# Patient Record
Sex: MALE | Race: WHITE | NOT HISPANIC OR LATINO | Employment: OTHER | RURAL
[De-identification: names, ages, dates, MRNs, and addresses within clinical notes are randomized per-mention and may not be internally consistent; named-entity substitution may affect disease eponyms.]

---

## 2017-08-09 ENCOUNTER — HISTORICAL (OUTPATIENT)
Dept: ADMINISTRATIVE | Facility: HOSPITAL | Age: 52
End: 2017-08-09

## 2017-08-09 LAB — HM COLONOSCOPY: NORMAL

## 2017-08-10 LAB
LAB AP CLINICAL INFORMATION: NORMAL
LAB AP COMMENTS: NORMAL
LAB AP DIAGNOSIS - HISTORICAL: NORMAL
LAB AP GROSS PATHOLOGY - HISTORICAL: NORMAL
LAB AP SPECIMEN SUBMITTED - HISTORICAL: NORMAL

## 2018-10-09 ENCOUNTER — HISTORICAL (OUTPATIENT)
Dept: ADMINISTRATIVE | Facility: HOSPITAL | Age: 53
End: 2018-10-09

## 2018-10-11 LAB
LAB AP CLINICAL INFORMATION: NORMAL
LAB AP DIAGNOSIS - HISTORICAL: NORMAL
LAB AP GROSS PATHOLOGY - HISTORICAL: NORMAL
LAB AP SPECIMEN SUBMITTED - HISTORICAL: NORMAL

## 2019-04-09 ENCOUNTER — HISTORICAL (OUTPATIENT)
Dept: ADMINISTRATIVE | Facility: HOSPITAL | Age: 54
End: 2019-04-09

## 2021-06-22 ENCOUNTER — OFFICE VISIT (OUTPATIENT)
Dept: FAMILY MEDICINE | Facility: CLINIC | Age: 56
End: 2021-06-22
Payer: OTHER GOVERNMENT

## 2021-06-22 VITALS
OXYGEN SATURATION: 97 % | RESPIRATION RATE: 20 BRPM | BODY MASS INDEX: 28.24 KG/M2 | HEART RATE: 68 BPM | DIASTOLIC BLOOD PRESSURE: 64 MMHG | WEIGHT: 190.63 LBS | TEMPERATURE: 97 F | HEIGHT: 69 IN | SYSTOLIC BLOOD PRESSURE: 110 MMHG

## 2021-06-22 DIAGNOSIS — G89.29 CHRONIC MIDLINE LOW BACK PAIN WITHOUT SCIATICA: Primary | ICD-10-CM

## 2021-06-22 DIAGNOSIS — N48.6 PEYRONIE'S DISEASE: ICD-10-CM

## 2021-06-22 DIAGNOSIS — M54.50 CHRONIC MIDLINE LOW BACK PAIN WITHOUT SCIATICA: Primary | ICD-10-CM

## 2021-06-22 DIAGNOSIS — M54.9 DORSALGIA, UNSPECIFIED: ICD-10-CM

## 2021-06-22 PROCEDURE — 99213 OFFICE O/P EST LOW 20 MIN: CPT | Mod: 25,,, | Performed by: NURSE PRACTITIONER

## 2021-06-22 PROCEDURE — 96372 PR INJECTION,THERAP/PROPH/DIAG2ST, IM OR SUBCUT: ICD-10-PCS | Mod: ,,, | Performed by: NURSE PRACTITIONER

## 2021-06-22 PROCEDURE — 96372 THER/PROPH/DIAG INJ SC/IM: CPT | Mod: ,,, | Performed by: NURSE PRACTITIONER

## 2021-06-22 PROCEDURE — 99213 PR OFFICE/OUTPT VISIT, EST, LEVL III, 20-29 MIN: ICD-10-PCS | Mod: 25,,, | Performed by: NURSE PRACTITIONER

## 2021-06-22 RX ORDER — DEXAMETHASONE SODIUM PHOSPHATE 4 MG/ML
4 INJECTION, SOLUTION INTRA-ARTICULAR; INTRALESIONAL; INTRAMUSCULAR; INTRAVENOUS; SOFT TISSUE
Status: DISCONTINUED | OUTPATIENT
Start: 2021-06-22 | End: 2021-06-22

## 2021-06-22 RX ORDER — LEVOFLOXACIN 750 MG/1
750 TABLET ORAL DAILY
COMMUNITY
Start: 2021-04-14 | End: 2021-06-22

## 2021-06-22 RX ORDER — ROSUVASTATIN CALCIUM 20 MG/1
20 TABLET, COATED ORAL DAILY
COMMUNITY
Start: 2021-05-28 | End: 2021-09-07 | Stop reason: SDUPTHER

## 2021-06-22 RX ORDER — DEXAMETHASONE SODIUM PHOSPHATE 4 MG/ML
4 INJECTION, SOLUTION INTRA-ARTICULAR; INTRALESIONAL; INTRAMUSCULAR; INTRAVENOUS; SOFT TISSUE
Status: COMPLETED | OUTPATIENT
Start: 2021-06-22 | End: 2021-06-22

## 2021-06-22 RX ORDER — TIZANIDINE 4 MG/1
4 TABLET ORAL EVERY 6 HOURS PRN
Qty: 14 TABLET | Refills: 0 | Status: SHIPPED | OUTPATIENT
Start: 2021-06-22 | End: 2021-07-02

## 2021-06-22 RX ORDER — METHYLPREDNISOLONE ACETATE 40 MG/ML
40 INJECTION, SUSPENSION INTRA-ARTICULAR; INTRALESIONAL; INTRAMUSCULAR; SOFT TISSUE
Status: COMPLETED | OUTPATIENT
Start: 2021-06-22 | End: 2021-06-22

## 2021-06-22 RX ORDER — MELOXICAM 7.5 MG/1
7.5 TABLET ORAL 2 TIMES DAILY
Qty: 60 TABLET | Refills: 0 | Status: SHIPPED | OUTPATIENT
Start: 2021-06-22 | End: 2022-12-06

## 2021-06-22 RX ADMIN — METHYLPREDNISOLONE ACETATE 40 MG: 40 INJECTION, SUSPENSION INTRA-ARTICULAR; INTRALESIONAL; INTRAMUSCULAR; SOFT TISSUE at 03:06

## 2021-06-22 RX ADMIN — DEXAMETHASONE SODIUM PHOSPHATE 4 MG: 4 INJECTION, SOLUTION INTRA-ARTICULAR; INTRALESIONAL; INTRAMUSCULAR; INTRAVENOUS; SOFT TISSUE at 03:06

## 2021-07-21 ENCOUNTER — HOSPITAL ENCOUNTER (OUTPATIENT)
Dept: RADIOLOGY | Facility: HOSPITAL | Age: 56
Discharge: HOME | End: 2021-07-21
Attending: NURSE PRACTITIONER
Payer: OTHER GOVERNMENT

## 2021-07-21 DIAGNOSIS — M51.36 BULGING OF LUMBAR INTERVERTEBRAL DISC WITHOUT MYELOPATHY: Primary | ICD-10-CM

## 2021-07-21 DIAGNOSIS — M54.50 CHRONIC MIDLINE LOW BACK PAIN WITHOUT SCIATICA: ICD-10-CM

## 2021-07-21 DIAGNOSIS — G89.29 CHRONIC MIDLINE LOW BACK PAIN WITHOUT SCIATICA: ICD-10-CM

## 2021-07-21 PROCEDURE — 72148 MRI LUMBAR SPINE W/O DYE: CPT | Mod: TC

## 2021-09-07 ENCOUNTER — INFUSION (OUTPATIENT)
Dept: INFECTIOUS DISEASES | Facility: HOSPITAL | Age: 56
End: 2021-09-07
Attending: NURSE PRACTITIONER
Payer: OTHER GOVERNMENT

## 2021-09-07 ENCOUNTER — OFFICE VISIT (OUTPATIENT)
Dept: FAMILY MEDICINE | Facility: CLINIC | Age: 56
End: 2021-09-07
Payer: OTHER GOVERNMENT

## 2021-09-07 VITALS
OXYGEN SATURATION: 95 % | TEMPERATURE: 99 F | HEART RATE: 84 BPM | DIASTOLIC BLOOD PRESSURE: 78 MMHG | RESPIRATION RATE: 20 BRPM | SYSTOLIC BLOOD PRESSURE: 120 MMHG

## 2021-09-07 VITALS
DIASTOLIC BLOOD PRESSURE: 73 MMHG | RESPIRATION RATE: 18 BRPM | SYSTOLIC BLOOD PRESSURE: 111 MMHG | HEIGHT: 69 IN | WEIGHT: 195 LBS | OXYGEN SATURATION: 97 % | BODY MASS INDEX: 28.88 KG/M2 | HEART RATE: 69 BPM | TEMPERATURE: 99 F

## 2021-09-07 DIAGNOSIS — U07.1 COVID-19: Primary | ICD-10-CM

## 2021-09-07 DIAGNOSIS — Z20.828 CONTACT WITH AND (SUSPECTED) EXPOSURE TO OTHER VIRAL COMMUNICABLE DISEASES: ICD-10-CM

## 2021-09-07 DIAGNOSIS — E78.5 HYPERLIPIDEMIA, UNSPECIFIED HYPERLIPIDEMIA TYPE: ICD-10-CM

## 2021-09-07 LAB
CTP QC/QA: YES
FLUAV AG NPH QL: NEGATIVE
FLUBV AG NPH QL: NEGATIVE
SARS-COV-2 AG RESP QL IA.RAPID: POSITIVE

## 2021-09-07 PROCEDURE — 63600175 PHARM REV CODE 636 W HCPCS: Performed by: NURSE PRACTITIONER

## 2021-09-07 PROCEDURE — 99213 PR OFFICE/OUTPT VISIT, EST, LEVL III, 20-29 MIN: ICD-10-PCS | Mod: ,,, | Performed by: NURSE PRACTITIONER

## 2021-09-07 PROCEDURE — 87428 SARSCOV & INF VIR A&B AG IA: CPT | Mod: QW,,, | Performed by: NURSE PRACTITIONER

## 2021-09-07 PROCEDURE — 25000003 PHARM REV CODE 250: Performed by: NURSE PRACTITIONER

## 2021-09-07 PROCEDURE — 99213 OFFICE O/P EST LOW 20 MIN: CPT | Mod: ,,, | Performed by: NURSE PRACTITIONER

## 2021-09-07 PROCEDURE — M0243 CASIRIVI AND IMDEVI INFUSION: HCPCS | Performed by: NURSE PRACTITIONER

## 2021-09-07 PROCEDURE — 87428 POCT SARS-COV2 (COVID) WITH FLU ANTIGEN: ICD-10-PCS | Mod: QW,,, | Performed by: NURSE PRACTITIONER

## 2021-09-07 RX ORDER — METHYLPREDNISOLONE 4 MG/1
TABLET ORAL
Qty: 1 PACKAGE | Refills: 0 | Status: SHIPPED | OUTPATIENT
Start: 2021-09-07 | End: 2021-09-28

## 2021-09-07 RX ORDER — ROSUVASTATIN CALCIUM 20 MG/1
20 TABLET, COATED ORAL DAILY
Qty: 30 TABLET | Refills: 5 | Status: SHIPPED | OUTPATIENT
Start: 2021-09-07 | End: 2022-03-22 | Stop reason: SDUPTHER

## 2021-09-07 RX ORDER — SODIUM CHLORIDE 0.9 % (FLUSH) 0.9 %
10 SYRINGE (ML) INJECTION
Status: DISCONTINUED | OUTPATIENT
Start: 2021-09-07 | End: 2022-03-22

## 2021-09-07 RX ORDER — DIPHENHYDRAMINE HYDROCHLORIDE 50 MG/ML
25 INJECTION INTRAMUSCULAR; INTRAVENOUS ONCE AS NEEDED
Status: DISCONTINUED | OUTPATIENT
Start: 2021-09-07 | End: 2022-03-22

## 2021-09-07 RX ORDER — EPINEPHRINE 0.3 MG/.3ML
0.3 INJECTION SUBCUTANEOUS
Status: DISCONTINUED | OUTPATIENT
Start: 2021-09-07 | End: 2022-03-22

## 2021-09-07 RX ORDER — SODIUM CHLORIDE 9 MG/ML
INJECTION, SOLUTION INTRAVENOUS
Status: DISCONTINUED
Start: 2021-09-07 | End: 2021-09-07 | Stop reason: HOSPADM

## 2021-09-07 RX ORDER — ONDANSETRON 4 MG/1
4 TABLET, ORALLY DISINTEGRATING ORAL ONCE AS NEEDED
Status: DISCONTINUED | OUTPATIENT
Start: 2021-09-07 | End: 2022-03-22

## 2021-09-07 RX ORDER — ALBUTEROL SULFATE 90 UG/1
2 AEROSOL, METERED RESPIRATORY (INHALATION)
Status: DISCONTINUED | OUTPATIENT
Start: 2021-09-07 | End: 2022-03-22

## 2021-09-07 RX ORDER — ACETAMINOPHEN 325 MG/1
650 TABLET ORAL ONCE AS NEEDED
Status: DISCONTINUED | OUTPATIENT
Start: 2021-09-07 | End: 2022-03-22

## 2021-09-07 RX ORDER — AZITHROMYCIN 250 MG/1
TABLET, FILM COATED ORAL
Qty: 6 TABLET | Refills: 0 | Status: SHIPPED | OUTPATIENT
Start: 2021-09-07 | End: 2022-03-22 | Stop reason: ALTCHOICE

## 2021-09-07 RX ADMIN — SODIUM CHLORIDE 600 MG: 9 INJECTION, SOLUTION INTRAVENOUS at 01:09

## 2022-03-22 ENCOUNTER — HOSPITAL ENCOUNTER (OUTPATIENT)
Dept: RADIOLOGY | Facility: HOSPITAL | Age: 57
Discharge: HOME | End: 2022-03-22
Attending: NURSE PRACTITIONER
Payer: OTHER GOVERNMENT

## 2022-03-22 ENCOUNTER — OFFICE VISIT (OUTPATIENT)
Dept: FAMILY MEDICINE | Facility: CLINIC | Age: 57
End: 2022-03-22
Payer: OTHER GOVERNMENT

## 2022-03-22 VITALS
HEART RATE: 67 BPM | OXYGEN SATURATION: 98 % | SYSTOLIC BLOOD PRESSURE: 116 MMHG | WEIGHT: 188 LBS | DIASTOLIC BLOOD PRESSURE: 76 MMHG | RESPIRATION RATE: 17 BRPM | TEMPERATURE: 98 F | BODY MASS INDEX: 27.85 KG/M2 | HEIGHT: 69 IN

## 2022-03-22 DIAGNOSIS — E78.5 HYPERLIPIDEMIA, UNSPECIFIED HYPERLIPIDEMIA TYPE: ICD-10-CM

## 2022-03-22 DIAGNOSIS — R10.31 RIGHT LOWER QUADRANT ABDOMINAL PAIN: Primary | ICD-10-CM

## 2022-03-22 DIAGNOSIS — R10.31 RIGHT LOWER QUADRANT ABDOMINAL PAIN: ICD-10-CM

## 2022-03-22 LAB
ALBUMIN SERPL BCP-MCNC: 4.3 G/DL (ref 3.5–5)
ALBUMIN/GLOB SERPL: 1.4 {RATIO}
ALP SERPL-CCNC: 86 U/L (ref 45–115)
ALT SERPL W P-5'-P-CCNC: 41 U/L (ref 16–61)
ANION GAP SERPL CALCULATED.3IONS-SCNC: 11 MMOL/L (ref 7–16)
AST SERPL W P-5'-P-CCNC: 21 U/L (ref 15–37)
BASOPHILS # BLD AUTO: 0.07 K/UL (ref 0–0.2)
BASOPHILS NFR BLD AUTO: 1.2 % (ref 0–1)
BILIRUB SERPL-MCNC: 1.1 MG/DL (ref 0–1.2)
BUN SERPL-MCNC: 12 MG/DL (ref 7–18)
BUN/CREAT SERPL: 13 (ref 6–20)
CALCIUM SERPL-MCNC: 9.1 MG/DL (ref 8.5–10.1)
CHLORIDE SERPL-SCNC: 103 MMOL/L (ref 98–107)
CHOLEST SERPL-MCNC: 330 MG/DL (ref 0–200)
CHOLEST/HDLC SERPL: 6 {RATIO}
CO2 SERPL-SCNC: 31 MMOL/L (ref 21–32)
CREAT SERPL-MCNC: 0.9 MG/DL (ref 0.7–1.3)
DIFFERENTIAL METHOD BLD: ABNORMAL
EOSINOPHIL # BLD AUTO: 0.14 K/UL (ref 0–0.5)
EOSINOPHIL NFR BLD AUTO: 2.3 % (ref 1–4)
ERYTHROCYTE [DISTWIDTH] IN BLOOD BY AUTOMATED COUNT: 11.9 % (ref 11.5–14.5)
GLOBULIN SER-MCNC: 3.1 G/DL (ref 2–4)
GLUCOSE SERPL-MCNC: 70 MG/DL (ref 74–106)
HCT VFR BLD AUTO: 49.9 % (ref 40–54)
HDLC SERPL-MCNC: 55 MG/DL (ref 40–60)
HGB BLD-MCNC: 16.5 G/DL (ref 13.5–18)
IMM GRANULOCYTES # BLD AUTO: 0.02 K/UL (ref 0–0.04)
IMM GRANULOCYTES NFR BLD: 0.3 % (ref 0–0.4)
LDLC SERPL CALC-MCNC: 232 MG/DL
LDLC/HDLC SERPL: 4.2 {RATIO}
LYMPHOCYTES # BLD AUTO: 1.54 K/UL (ref 1–4.8)
LYMPHOCYTES NFR BLD AUTO: 25.5 % (ref 27–41)
MCH RBC QN AUTO: 32.7 PG (ref 27–31)
MCHC RBC AUTO-ENTMCNC: 33.1 G/DL (ref 32–36)
MCV RBC AUTO: 98.8 FL (ref 80–96)
MONOCYTES # BLD AUTO: 0.64 K/UL (ref 0–0.8)
MONOCYTES NFR BLD AUTO: 10.6 % (ref 2–6)
MPC BLD CALC-MCNC: 10 FL (ref 9.4–12.4)
NEUTROPHILS # BLD AUTO: 3.62 K/UL (ref 1.8–7.7)
NEUTROPHILS NFR BLD AUTO: 60.1 % (ref 53–65)
NONHDLC SERPL-MCNC: 275 MG/DL
NRBC # BLD AUTO: 0 X10E3/UL
NRBC, AUTO (.00): 0 %
PLATELET # BLD AUTO: 246 K/UL (ref 150–400)
POTASSIUM SERPL-SCNC: 4.9 MMOL/L (ref 3.5–5.1)
PROT SERPL-MCNC: 7.4 G/DL (ref 6.4–8.2)
RBC # BLD AUTO: 5.05 M/UL (ref 4.6–6.2)
SODIUM SERPL-SCNC: 140 MMOL/L (ref 136–145)
TRIGL SERPL-MCNC: 214 MG/DL (ref 35–150)
VLDLC SERPL-MCNC: 43 MG/DL
WBC # BLD AUTO: 6.03 K/UL (ref 4.5–11)

## 2022-03-22 PROCEDURE — 80061 LIPID PANEL: CPT | Mod: ,,, | Performed by: CLINICAL MEDICAL LABORATORY

## 2022-03-22 PROCEDURE — 80061 LIPID PANEL: ICD-10-PCS | Mod: ,,, | Performed by: CLINICAL MEDICAL LABORATORY

## 2022-03-22 PROCEDURE — 85025 COMPLETE CBC W/AUTO DIFF WBC: CPT | Mod: ,,, | Performed by: CLINICAL MEDICAL LABORATORY

## 2022-03-22 PROCEDURE — 99213 OFFICE O/P EST LOW 20 MIN: CPT | Mod: ,,, | Performed by: NURSE PRACTITIONER

## 2022-03-22 PROCEDURE — 80053 COMPREHEN METABOLIC PANEL: CPT | Mod: ,,, | Performed by: CLINICAL MEDICAL LABORATORY

## 2022-03-22 PROCEDURE — 85025 CBC WITH DIFFERENTIAL: ICD-10-PCS | Mod: ,,, | Performed by: CLINICAL MEDICAL LABORATORY

## 2022-03-22 PROCEDURE — 76700 US EXAM ABDOM COMPLETE: CPT | Mod: TC

## 2022-03-22 PROCEDURE — 99213 PR OFFICE/OUTPT VISIT, EST, LEVL III, 20-29 MIN: ICD-10-PCS | Mod: ,,, | Performed by: NURSE PRACTITIONER

## 2022-03-22 PROCEDURE — 80053 COMPREHENSIVE METABOLIC PANEL: ICD-10-PCS | Mod: ,,, | Performed by: CLINICAL MEDICAL LABORATORY

## 2022-03-22 RX ORDER — METHOCARBAMOL 500 MG/1
500 TABLET, FILM COATED ORAL 4 TIMES DAILY PRN
COMMUNITY
Start: 2021-10-04

## 2022-03-22 RX ORDER — ROSUVASTATIN CALCIUM 20 MG/1
20 TABLET, COATED ORAL DAILY
Qty: 30 TABLET | Refills: 5 | Status: SHIPPED | OUTPATIENT
Start: 2022-03-22 | End: 2022-03-23 | Stop reason: DRUGHIGH

## 2022-03-22 NOTE — PROGRESS NOTES
CLARENCE Fields   [facility]  [address]  [place] [address]     PATIENT NAME: Linden Chávez  : 1965  DATE: 3/22/22  MRN: [id number]      Billing Provider: CLARENCE Fields  Level of Service:   Patient PCP Information     Provider PCP Type    CLARENCE Fields [facility]          Reason for Visit / Chief Complaint: Abdominal Pain (Pt c/o pain and burning sensation in the RLQ of his abdomen, states that it hurts the worst when he sneezes, can feel some discomfort with certain movements. )       Update PCP  Update Chief Complaint         History of Present Illness / Problem Focused Workflow     Linden Chávez presents to the clinic c/o burning pain right lower abd for over a week. Pt states pain started after he sneezed; pain is better but he still notices pain with sneezing and coughing. Denies changes in urination or BM; denies N/V and no hx of kidney stone      Review of Systems     Review of Systems   Constitutional: Negative.  Negative for activity change, appetite change, chills, fever and unexpected weight change.   Respiratory: Negative for cough, chest tightness and shortness of breath.    Cardiovascular: Negative for chest pain, palpitations and leg swelling.   Gastrointestinal: Positive for abdominal pain. Negative for abdominal distention, blood in stool, change in bowel habit, constipation, diarrhea, nausea, rectal pain, vomiting and change in bowel habit.   Genitourinary: Negative for difficulty urinating, frequency, hematuria and urgency.   Musculoskeletal: Positive for back pain.   Neurological: Negative for weakness and headaches.        Medical / Social / Family History     Past Medical History:   Diagnosis Date    Actinic keratoses     BCC (basal cell carcinoma of skin) 2019    left inferior posterior helix, Dr. Palacio    BPH (benign prostatic hyperplasia)     DDD (degenerative disc disease), thoracolumbar     GERD (gastroesophageal reflux disease)      Hyperlipidemia     Sleep apnea        Past Surgical History:   Procedure Laterality Date    BASAL CELL CARCINOMA EXCISION Left     ear    COLONOSCOPY  08/09/2017    SKIN BIOPSY  10/10/2018       Social History    reports that he has never smoked. His smokeless tobacco use includes snuff. He reports current alcohol use of about 14.0 standard drinks of alcohol per week. He reports that he does not use drugs.    Family History  's family history includes Arthritis in his brother; Cancer in his father and mother; Heart disease in his father; Hyperlipidemia in his father; Melanoma in his father.    Medications and Allergies     Medications  Outpatient Medications Marked as Taking for the 3/22/22 encounter (Office Visit) with CLARENCE Farah   Medication Sig Dispense Refill    meloxicam (MOBIC) 7.5 MG tablet Take 1 tablet (7.5 mg total) by mouth 2 (two) times daily. 60 tablet 0    methocarbamoL (ROBAXIN) 500 MG Tab Take 500 mg by mouth 4 (four) times daily as needed.      [DISCONTINUED] rosuvastatin (CRESTOR) 20 MG tablet Take 1 tablet (20 mg total) by mouth once daily. 30 tablet 5     Current Facility-Administered Medications for the 3/22/22 encounter (Office Visit) with CLARENCE Farah   Medication Dose Route Frequency Provider Last Rate Last Admin    [DISCONTINUED] acetaminophen tablet 650 mg  650 mg Oral Once PRN CLARENCE Farah        [DISCONTINUED] albuterol inhaler 2 puff  2 puff Inhalation Q20 Min PRN CLARENCE Farah        [DISCONTINUED] diphenhydrAMINE injection 25 mg  25 mg Intravenous Once PRN CALRENCE Farah        [DISCONTINUED] EPINEPHrine (EPIPEN) 0.3 mg/0.3 mL pen injection 0.3 mg  0.3 mg Intramuscular PRN CLARENCE Farah        [DISCONTINUED] methylPREDNISolone sodium succinate injection 40 mg  40 mg Intravenous Once PRN CLARENCE Farah        [DISCONTINUED] ondansetron disintegrating tablet 4 mg  4 mg Oral Once PRN Roxana Bailey  "FNP        [DISCONTINUED] sodium chloride 0.9% 500 mL flush bag   Intravenous PRN Roxana FLOWERS CLARENCE Bailey        [DISCONTINUED] sodium chloride 0.9% flush 10 mL  10 mL Intravenous PRN CLARENCE Farah           Allergies  Review of patient's allergies indicates:  No Known Allergies    Physical Examination     Vitals:    03/22/22 0905   BP: 116/76   BP Location: Right arm   Patient Position: Sitting   BP Method: Large (Manual)   Pulse: 67   Resp: 17   Temp: 98 °F (36.7 °C)   TempSrc: Oral   SpO2: 98%   Weight: 85.3 kg (188 lb)   Height: 5' 9" (1.753 m)      Physical Exam  Constitutional:       General: He is not in acute distress.     Appearance: Normal appearance.   Neck:      Thyroid: No thyromegaly.      Vascular: No carotid bruit.   Cardiovascular:      Rate and Rhythm: Normal rate and regular rhythm.      Heart sounds: No murmur heard.  Pulmonary:      Effort: Pulmonary effort is normal. No accessory muscle usage or respiratory distress.      Breath sounds: Normal breath sounds. No wheezing, rhonchi or rales.   Abdominal:      General: Bowel sounds are normal. There is no distension.      Palpations: Abdomen is soft. There is no mass.      Tenderness: There is abdominal tenderness in the right lower quadrant. There is no rebound.      Hernia: No hernia is present. There is no hernia in the right femoral area or right inguinal area.       Musculoskeletal:         General: Normal range of motion.      Cervical back: Neck supple.   Skin:     General: Skin is warm and dry.      Coloration: Skin is not cyanotic, jaundiced or pale.   Neurological:      Mental Status: He is alert and oriented to person, place, and time.      Cranial Nerves: Cranial nerves are intact.      Gait: Gait is intact.          Assessment and Plan (including Health Maintenance)      Problem List  Smart Sets  Document Outside HM   :        Health Maintenance Due   Topic Date Due    Hepatitis C Screening  Never done    Lipid Panel  Never " done    HIV Screening  Never done    Shingles Vaccine (1 of 2) Never done    COVID-19 Vaccine (3 - Booster for Moderna series) 07/02/2021    Influenza Vaccine (1) 09/01/2021       Problem List Items Addressed This Visit    None     Visit Diagnoses     Right lower quadrant abdominal pain    -  Primary    Flat and upright of abd obtained and sent to radiology and pt sent to [place] for US which was normal. Probable pulled muscle since symptoms have improved.    Relevant Orders    X-Ray Abdomen Flat And Erect (Completed)    CBC Auto Differential    Comprehensive Metabolic Panel    US Abdomen Complete (Completed)    Hyperlipidemia, unspecified hyperlipidemia type        Fasting lipids ordered. Continue current medication and LFLC. LDL goal < 70    Relevant Medications    rosuvastatin (CRESTOR) 20 MG tablet    Other Relevant Orders    Lipid Panel        Right lower quadrant abdominal pain  Comments:  Flat and upright of abd obtained and sent to radiology and pt sent to [place] for US which was normal. Probable pulled muscle since symptoms have improved.  Orders:  -     X-Ray Abdomen Flat And Erect; Future; Expected date: 03/22/2022  -     CBC Auto Differential; Future; Expected date: 03/22/2022  -     Comprehensive Metabolic Panel; Future; Expected date: 03/22/2022  -     US Abdomen Complete; Future; Expected date: 03/22/2022    Hyperlipidemia, unspecified hyperlipidemia type  Comments:  Fasting lipids ordered. Continue current medication and LFLC. LDL goal < 70  Orders:  -     rosuvastatin (CRESTOR) 20 MG tablet; Take 1 tablet (20 mg total) by mouth once daily.  Dispense: 30 tablet; Refill: 5  -     Lipid Panel; Future; Expected date: 03/22/2022       Health Maintenance Topics with due status: Not Due       Topic Last Completion Date    Colorectal Cancer Screening 08/09/2017    TETANUS VACCINE 01/24/2018       Procedures         Follow up in about 2 weeks (around 4/5/2022), or if symptoms worsen or fail to improve.      Signature:  CLARENCE Fields    Date of encounter: 3/22/22

## 2022-03-23 RX ORDER — ROSUVASTATIN CALCIUM 40 MG/1
40 TABLET, COATED ORAL NIGHTLY
Qty: 90 TABLET | Refills: 0 | Status: SHIPPED | OUTPATIENT
Start: 2022-03-23 | End: 2022-09-30 | Stop reason: SDUPTHER

## 2022-06-08 ENCOUNTER — HOSPITAL ENCOUNTER (EMERGENCY)
Facility: HOSPITAL | Age: 57
Discharge: HOME | End: 2022-06-08
Attending: EMERGENCY MEDICINE
Payer: OTHER GOVERNMENT

## 2022-06-08 ENCOUNTER — OFFICE VISIT (OUTPATIENT)
Dept: FAMILY MEDICINE | Facility: CLINIC | Age: 57
End: 2022-06-08
Payer: OTHER GOVERNMENT

## 2022-06-08 VITALS
RESPIRATION RATE: 20 BRPM | DIASTOLIC BLOOD PRESSURE: 82 MMHG | HEIGHT: 69 IN | OXYGEN SATURATION: 95 % | BODY MASS INDEX: 27.52 KG/M2 | SYSTOLIC BLOOD PRESSURE: 104 MMHG | TEMPERATURE: 99 F | HEART RATE: 83 BPM | WEIGHT: 185.81 LBS

## 2022-06-08 VITALS
OXYGEN SATURATION: 96 % | HEART RATE: 66 BPM | HEIGHT: 69 IN | SYSTOLIC BLOOD PRESSURE: 114 MMHG | RESPIRATION RATE: 18 BRPM | BODY MASS INDEX: 27.4 KG/M2 | TEMPERATURE: 99 F | WEIGHT: 185 LBS | DIASTOLIC BLOOD PRESSURE: 79 MMHG

## 2022-06-08 DIAGNOSIS — K57.92 DIVERTICULITIS: Primary | ICD-10-CM

## 2022-06-08 DIAGNOSIS — R10.30 LOWER ABDOMINAL PAIN: Primary | ICD-10-CM

## 2022-06-08 DIAGNOSIS — R10.9 ABDOMINAL PAIN, UNSPECIFIED ABDOMINAL LOCATION: ICD-10-CM

## 2022-06-08 DIAGNOSIS — N52.9 ERECTILE DYSFUNCTION, UNSPECIFIED ERECTILE DYSFUNCTION TYPE: ICD-10-CM

## 2022-06-08 LAB
ALBUMIN SERPL BCP-MCNC: 4 G/DL (ref 3.5–5)
ALBUMIN/GLOB SERPL: 1.2 {RATIO}
ALP SERPL-CCNC: 92 U/L (ref 45–115)
ALT SERPL W P-5'-P-CCNC: 34 U/L (ref 16–61)
AMYLASE SERPL-CCNC: 42 U/L (ref 25–115)
ANION GAP SERPL CALCULATED.3IONS-SCNC: 12 MMOL/L (ref 7–16)
AST SERPL W P-5'-P-CCNC: 25 U/L (ref 15–37)
BASOPHILS # BLD AUTO: 0.04 K/UL (ref 0–0.2)
BASOPHILS NFR BLD AUTO: 0.3 % (ref 0–1)
BILIRUB SERPL-MCNC: 1.1 MG/DL (ref 0–1.2)
BILIRUB SERPL-MCNC: NEGATIVE MG/DL
BILIRUB UR QL STRIP: NEGATIVE
BLOOD URINE, POC: NEGATIVE
BUN SERPL-MCNC: 11 MG/DL (ref 7–18)
BUN/CREAT SERPL: 14 (ref 6–20)
CALCIUM SERPL-MCNC: 9.2 MG/DL (ref 8.5–10.1)
CHLORIDE SERPL-SCNC: 106 MMOL/L (ref 98–107)
CLARITY UR: CLEAR
CO2 SERPL-SCNC: 26 MMOL/L (ref 21–32)
COLOR UR: YELLOW
COLOR, POC UA: YELLOW
CREAT SERPL-MCNC: 0.79 MG/DL (ref 0.7–1.3)
DIFFERENTIAL METHOD BLD: ABNORMAL
EOSINOPHIL # BLD AUTO: 0.06 K/UL (ref 0–0.5)
EOSINOPHIL NFR BLD AUTO: 0.5 % (ref 1–4)
ERYTHROCYTE [DISTWIDTH] IN BLOOD BY AUTOMATED COUNT: 11.7 % (ref 11.5–14.5)
GLOBULIN SER-MCNC: 3.3 G/DL (ref 2–4)
GLUCOSE SERPL-MCNC: 89 MG/DL (ref 74–106)
GLUCOSE UR QL STRIP: NEGATIVE
GLUCOSE UR STRIP-MCNC: NEGATIVE MG/DL
HCT VFR BLD AUTO: 44.8 % (ref 40–54)
HGB BLD-MCNC: 15.6 G/DL (ref 13.5–18)
IMM GRANULOCYTES # BLD AUTO: 0.04 K/UL (ref 0–0.04)
IMM GRANULOCYTES NFR BLD: 0.3 % (ref 0–0.4)
KETONES UR QL STRIP: NEGATIVE
KETONES UR STRIP-SCNC: 15 MG/DL
LACTATE SERPL-SCNC: 0.8 MMOL/L (ref 0.4–2)
LEUKOCYTE ESTERASE UR QL STRIP: NEGATIVE
LEUKOCYTE ESTERASE URINE, POC: NEGATIVE
LIPASE SERPL-CCNC: 116 U/L (ref 73–393)
LYMPHOCYTES # BLD AUTO: 1.64 K/UL (ref 1–4.8)
LYMPHOCYTES NFR BLD AUTO: 13.2 % (ref 27–41)
MAGNESIUM SERPL-MCNC: 2.5 MG/DL (ref 1.7–2.3)
MCH RBC QN AUTO: 32.8 PG (ref 27–31)
MCHC RBC AUTO-ENTMCNC: 34.8 G/DL (ref 32–36)
MCV RBC AUTO: 94.3 FL (ref 80–96)
MONOCYTES # BLD AUTO: 1.16 K/UL (ref 0–0.8)
MONOCYTES NFR BLD AUTO: 9.3 % (ref 2–6)
MPC BLD CALC-MCNC: 9.5 FL (ref 9.4–12.4)
NEUTROPHILS # BLD AUTO: 9.5 K/UL (ref 1.8–7.7)
NEUTROPHILS NFR BLD AUTO: 76.4 % (ref 53–65)
NITRITE UR QL STRIP: NEGATIVE
NITRITE, POC UA: NEGATIVE
NRBC # BLD AUTO: 0 X10E3/UL
NRBC, AUTO (.00): 0 %
PH UR STRIP: 6 PH UNITS
PH, POC UA: 8.5
PLATELET # BLD AUTO: 235 K/UL (ref 150–400)
POTASSIUM SERPL-SCNC: 4 MMOL/L (ref 3.5–5.1)
PROT SERPL-MCNC: 7.3 G/DL (ref 6.4–8.2)
PROT UR QL STRIP: ABNORMAL
PROTEIN, POC: NEGATIVE
RBC # BLD AUTO: 4.75 M/UL (ref 4.6–6.2)
RBC # UR STRIP: NEGATIVE /UL
SODIUM SERPL-SCNC: 140 MMOL/L (ref 136–145)
SP GR UR STRIP: >=1.03
SPECIFIC GRAVITY, POC UA: 1.02
UROBILINOGEN UR STRIP-ACNC: 0.2 MG/DL
UROBILINOGEN, POC UA: 1
WBC # BLD AUTO: 12.44 K/UL (ref 4.5–11)

## 2022-06-08 PROCEDURE — 82150 ASSAY OF AMYLASE: CPT | Performed by: EMERGENCY MEDICINE

## 2022-06-08 PROCEDURE — 99283 PR EMERGENCY DEPT VISIT,LEVEL III: ICD-10-PCS | Mod: ,,, | Performed by: EMERGENCY MEDICINE

## 2022-06-08 PROCEDURE — 81003 POCT URINALYSIS W/O SCOPE: ICD-10-PCS | Mod: QW,,, | Performed by: NURSE PRACTITIONER

## 2022-06-08 PROCEDURE — 83690 ASSAY OF LIPASE: CPT | Performed by: EMERGENCY MEDICINE

## 2022-06-08 PROCEDURE — 83735 ASSAY OF MAGNESIUM: CPT | Performed by: EMERGENCY MEDICINE

## 2022-06-08 PROCEDURE — 83605 ASSAY OF LACTIC ACID: CPT | Performed by: EMERGENCY MEDICINE

## 2022-06-08 PROCEDURE — 81003 URINALYSIS AUTO W/O SCOPE: CPT | Performed by: EMERGENCY MEDICINE

## 2022-06-08 PROCEDURE — 81003 URINALYSIS AUTO W/O SCOPE: CPT | Mod: QW,,, | Performed by: NURSE PRACTITIONER

## 2022-06-08 PROCEDURE — 85025 COMPLETE CBC W/AUTO DIFF WBC: CPT | Performed by: EMERGENCY MEDICINE

## 2022-06-08 PROCEDURE — 96365 THER/PROPH/DIAG IV INF INIT: CPT

## 2022-06-08 PROCEDURE — 99285 EMERGENCY DEPT VISIT HI MDM: CPT | Mod: 25

## 2022-06-08 PROCEDURE — 63600175 PHARM REV CODE 636 W HCPCS: Performed by: EMERGENCY MEDICINE

## 2022-06-08 PROCEDURE — 99213 OFFICE O/P EST LOW 20 MIN: CPT | Mod: ,,, | Performed by: NURSE PRACTITIONER

## 2022-06-08 PROCEDURE — 99283 EMERGENCY DEPT VISIT LOW MDM: CPT | Mod: ,,, | Performed by: EMERGENCY MEDICINE

## 2022-06-08 PROCEDURE — 96361 HYDRATE IV INFUSION ADD-ON: CPT

## 2022-06-08 PROCEDURE — 99213 PR OFFICE/OUTPT VISIT, EST, LEVL III, 20-29 MIN: ICD-10-PCS | Mod: ,,, | Performed by: NURSE PRACTITIONER

## 2022-06-08 PROCEDURE — 96375 TX/PRO/DX INJ NEW DRUG ADDON: CPT

## 2022-06-08 PROCEDURE — 25000003 PHARM REV CODE 250: Performed by: EMERGENCY MEDICINE

## 2022-06-08 PROCEDURE — 80053 COMPREHEN METABOLIC PANEL: CPT | Performed by: EMERGENCY MEDICINE

## 2022-06-08 PROCEDURE — 36415 COLL VENOUS BLD VENIPUNCTURE: CPT | Performed by: EMERGENCY MEDICINE

## 2022-06-08 RX ORDER — TADALAFIL 20 MG/1
20 TABLET ORAL DAILY
Qty: 30 TABLET | Refills: 5 | Status: SHIPPED | OUTPATIENT
Start: 2022-06-08 | End: 2022-12-06 | Stop reason: SDUPTHER

## 2022-06-08 RX ORDER — CIPROFLOXACIN 2 MG/ML
400 INJECTION, SOLUTION INTRAVENOUS
Status: COMPLETED | OUTPATIENT
Start: 2022-06-08 | End: 2022-06-08

## 2022-06-08 RX ORDER — ONDANSETRON 2 MG/ML
4 INJECTION INTRAMUSCULAR; INTRAVENOUS
Status: COMPLETED | OUTPATIENT
Start: 2022-06-08 | End: 2022-06-08

## 2022-06-08 RX ORDER — MORPHINE SULFATE 4 MG/ML
4 INJECTION, SOLUTION INTRAMUSCULAR; INTRAVENOUS
Status: COMPLETED | OUTPATIENT
Start: 2022-06-08 | End: 2022-06-08

## 2022-06-08 RX ADMIN — SODIUM CHLORIDE 1000 ML: 9 INJECTION, SOLUTION INTRAVENOUS at 08:06

## 2022-06-08 RX ADMIN — MORPHINE SULFATE 4 MG: 4 INJECTION INTRAVENOUS at 08:06

## 2022-06-08 RX ADMIN — CIPROFLOXACIN 400 MG: 2 INJECTION, SOLUTION INTRAVENOUS at 10:06

## 2022-06-08 RX ADMIN — ONDANSETRON 4 MG: 2 INJECTION INTRAMUSCULAR; INTRAVENOUS at 08:06

## 2022-06-08 NOTE — PROGRESS NOTES
CLARENCE Fields   [facility]  [address]  [place] [address]     PATIENT NAME: Linden Chávez  : 1965  DATE: 22  MRN: [id number]      Billing Provider: CLARENCE Fields  Level of Service: NC OFFICE/OUTPT VISIT, EST, LEVL III, 20-29 MIN  Patient PCP Information     Provider PCP Type    CLARENCE Fields General          Reason for Visit / Chief Complaint: Abdominal Pain (Started  pain in lower abdomen thought was gas at first but it has lasted since . Pt states his urine is darker that normal but it is not painful or burning un urination /)       Update PCP  Update Chief Complaint         History of Present Illness / Problem Focused Workflow     Linden Chávez presents to the clinic c/o midline lower abd pain for the past 4 days. Denies constipation or diarrhea. Has noticed urine is darker than normal but he has been outside working in the heat and not drinking more fluids. No known fever      Review of Systems     Review of Systems   Constitutional: Negative.  Negative for activity change, appetite change, fever and unexpected weight change.   Eyes: Negative for visual disturbance.   Respiratory: Negative for chest tightness and shortness of breath.    Cardiovascular: Negative for chest pain, palpitations and leg swelling.   Gastrointestinal: Positive for abdominal pain. Negative for nausea and vomiting.   Endocrine: Negative for cold intolerance, heat intolerance, polydipsia, polyphagia and polyuria.   Genitourinary: Negative for decreased urine volume, difficulty urinating, dysuria, flank pain, hematuria and testicular pain.   Musculoskeletal: Negative for back pain.   Neurological: Negative for dizziness, weakness and headaches.        Medical / Social / Family History     Past Medical History:   Diagnosis Date    Actinic keratoses     BCC (basal cell carcinoma of skin) 2019    left inferior posterior helix, Dr. Palacio    BPH (benign prostatic  "hyperplasia)     DDD (degenerative disc disease), thoracolumbar     GERD (gastroesophageal reflux disease)     Hyperlipidemia     Sleep apnea        Past Surgical History:   Procedure Laterality Date    BASAL CELL CARCINOMA EXCISION Left     ear    COLONOSCOPY  08/09/2017    SKIN BIOPSY  10/10/2018       Social History    reports that he has never smoked. His smokeless tobacco use includes snuff. He reports current alcohol use of about 14.0 standard drinks of alcohol per week. He reports that he does not use drugs.    Family History  's family history includes Arthritis in his brother; Cancer in his father and mother; Heart disease in his father; Hyperlipidemia in his father; Melanoma in his father.    Medications and Allergies     Medications  Outpatient Medications Marked as Taking for the 6/8/22 encounter (Office Visit) with CLARENCE Farah   Medication Sig Dispense Refill    methocarbamoL (ROBAXIN) 500 MG Tab Take 500 mg by mouth 4 (four) times daily as needed.      rosuvastatin (CRESTOR) 40 MG Tab Take 1 tablet (40 mg total) by mouth every evening. 90 tablet 0       Allergies  Review of patient's allergies indicates:  No Known Allergies    Physical Examination     Vitals:    06/08/22 1359   BP: 104/82   BP Location: Right arm   Patient Position: Sitting   BP Method: Medium (Manual)   Pulse: 83   Resp: 20   Temp: 98.6 °F (37 °C)   TempSrc: Oral   SpO2: 95%   Weight: 84.3 kg (185 lb 12.8 oz)   Height: 5' 9" (1.753 m)      Physical Exam  HENT:      Nose: No congestion.   Neck:      Thyroid: No thyromegaly.      Vascular: No carotid bruit.   Cardiovascular:      Rate and Rhythm: Normal rate and regular rhythm.      Heart sounds: Normal heart sounds. No murmur heard.  Pulmonary:      Effort: Pulmonary effort is normal. No accessory muscle usage or respiratory distress.      Breath sounds: Normal breath sounds. No wheezing, rhonchi or rales.   Abdominal:      General: Bowel sounds are normal. " There is no distension.      Palpations: Abdomen is soft. There is no mass.      Tenderness: There is abdominal tenderness. There is guarding. There is no right CVA tenderness, left CVA tenderness or rebound. Negative signs include Weeks's sign and psoas sign.      Hernia: No hernia is present.      Comments: Tenderness with palpation lower midline abd.  US in the past was neg.   Musculoskeletal:         General: Normal range of motion.      Cervical back: Neck supple.      Right lower leg: No edema.      Left lower leg: No edema.   Skin:     General: Skin is warm and dry.      Coloration: Skin is not cyanotic, jaundiced or pale.   Neurological:      Mental Status: He is alert and oriented to person, place, and time.      Cranial Nerves: Cranial nerves are intact.      Gait: Gait is intact.          Assessment and Plan (including Health Maintenance)      Problem List  Smart Sets  Document Outside HM   :  EXAMINATION: 03/22/22    US ABDOMEN COMPLETE     CLINICAL HISTORY:  Abdominal Pain     TECHNIQUE:  Grayscale and color Doppler imaging performed     COMPARISON:  None available     FINDINGS:  The liver is normal in size and echogenicity. The gallbladder is fluid-filled without evidence of stones or sludge. The gallbladder wall thickness is 2.2 mm. The common bile duct measures 1.8 mm.     The visualized portion of the pancreas appear within normal limits     The kidneys are normal in size and echogenicity without hydronephrosis or other abnormality. The right renal length is 11.0 cm. Left renal length is 11.52 cm.     Spleen, aorta and IVC appear within normal limits. No free fluid or free air seen.     Impression:     No evidence of abnormality demonstrated      There are no preventive care reminders to display for this patient.    Problem List Items Addressed This Visit    None     Visit Diagnoses     Lower abdominal pain    -  Primary    Urinalysis wnl, will order CT of abd and pelvis to rule out diverticulitis.  CT of abd scheduled for next Tuesday @ 0830     Relevant Orders    CBC Auto Differential    PSA, Screening    CT Abdomen Pelvis With Contrast    Abdominal pain, unspecified abdominal location        Relevant Orders    POCT URINALYSIS W/O SCOPE (Completed)    Erectile dysfunction, unspecified erectile dysfunction type        Continue current medication.        Lower abdominal pain  Comments:  Urinalysis wnl, will order CT of abd and pelvis to rule out diverticulitis. CT of abd scheduled for next Tuesday @ 0830   Orders:  -     CBC Auto Differential; Future; Expected date: 06/08/2022  -     PSA, Screening; Future; Expected date: 06/08/2022  -     CT Abdomen Pelvis With Contrast; Future; Expected date: 06/08/2022    Abdominal pain, unspecified abdominal location  -     POCT URINALYSIS W/O SCOPE    Erectile dysfunction, unspecified erectile dysfunction type  Comments:  Continue current medication.    Other orders  -     tadalafiL (CIALIS) 20 MG Tab; Take 1 tablet (20 mg total) by mouth once daily.  Dispense: 30 tablet; Refill: 5       Health Maintenance Topics with due status: Not Due       Topic Last Completion Date    Colorectal Cancer Screening 08/09/2017    TETANUS VACCINE 01/24/2018    Influenza Vaccine 11/05/2019    Lipid Panel 03/22/2022       Procedures        Follow up in about 1 week (around 6/15/2022).     Signature:  CLARENCE Fields    Date of encounter: 6/8/22

## 2022-06-09 ENCOUNTER — TELEPHONE (OUTPATIENT)
Dept: EMERGENCY MEDICINE | Facility: HOSPITAL | Age: 57
End: 2022-06-09
Payer: OTHER GOVERNMENT

## 2022-06-09 NOTE — ED TRIAGE NOTES
Patient reports constant pain on his low abdomen that is constant since Sunday.  Patient saw his PCP today and is scheduled for outpatient CT; however, today at 1730 he had a fever of 102.3 with chills; he took approximately 400 mg of ibuprofen.

## 2022-06-09 NOTE — DISCHARGE INSTRUCTIONS
TAKE CIPROFLOXACIN AND FLAGYL AS DIRECTED.  DRINK PLENTY OF FLUIDS.  FOLLOW UP IF SYMPTOMS PERSIST OR WORSEN OR OTHERWISE AS NEEDED.    
Discussed with patient and group home staff member

## 2022-06-09 NOTE — ED PROVIDER NOTES
Encounter Date: 6/8/2022    SCRIBE #1 NOTE: I, Ade Billy, am scribing for, and in the presence of,  Arnel Lopez MD. I have scribed the entire note.       History     Chief Complaint   Patient presents with    Abdominal Pain     Patient is a 56 year old male who presents to the emergency department complaining of left lower abdominal pain that onset on Sunday 6/5/22 and worsened today. Patient explains that he thought he has eaten too much on Sunday afternoon but became concerned when the pain remained consistent. Patient explains that the pain is worsened by coughing, sneezing, or urination, compares the pain to a squeezing like discomfort. As of today the patient reports that he had a fever and some chills, causing concern for the patient and his PCP. Patient is scheduled to have a scan of his abdomen completed on June 14th, 2022.     The history is provided by the patient. No  was used.     Review of patient's allergies indicates:  No Known Allergies  Past Medical History:   Diagnosis Date    Actinic keratoses     BCC (basal cell carcinoma of skin) 04/09/2019    left inferior posterior helix, Dr. Palacio    BPH (benign prostatic hyperplasia)     DDD (degenerative disc disease), thoracolumbar     GERD (gastroesophageal reflux disease)     Hyperlipidemia     Sleep apnea      Past Surgical History:   Procedure Laterality Date    BASAL CELL CARCINOMA EXCISION Left     ear    COLONOSCOPY  08/09/2017    SKIN BIOPSY  10/10/2018     Family History   Problem Relation Age of Onset    Melanoma Father     Heart disease Father     Cancer Father     Hyperlipidemia Father     Cancer Mother     Arthritis Brother      Social History     Tobacco Use    Smoking status: Never Smoker    Smokeless tobacco: Current User     Types: Snuff   Substance Use Topics    Alcohol use: Yes     Alcohol/week: 14.0 standard drinks     Types: 14 Cans of beer per week     Comment: Social    Drug use: Never      Review of Systems   Constitutional: Positive for chills and fever.   HENT: Negative.    Eyes: Negative.    Respiratory: Negative.    Cardiovascular: Negative.    Gastrointestinal: Positive for abdominal pain (lower left).   Endocrine: Negative.    Genitourinary: Negative.    Musculoskeletal: Negative.    Skin: Negative.    Allergic/Immunologic: Negative.    Neurological: Negative.    Hematological: Negative.    Psychiatric/Behavioral: Negative.    All other systems reviewed and are negative.      Physical Exam     Initial Vitals [06/08/22 2006]   BP Pulse Resp Temp SpO2   126/74 83 18 99.4 °F (37.4 °C) 96 %      MAP       --         Physical Exam    Nursing note and vitals reviewed.  Constitutional: He appears well-developed and well-nourished.   HENT:   Head: Normocephalic and atraumatic.   Eyes: Conjunctivae and EOM are normal. Pupils are equal, round, and reactive to light.   Neck: Neck supple.   Normal range of motion.  Cardiovascular: Normal rate, regular rhythm, normal heart sounds and intact distal pulses.   Pulmonary/Chest: Breath sounds normal.   Abdominal: Abdomen is soft. Bowel sounds are normal. There is abdominal tenderness in the left lower quadrant.   Musculoskeletal:         General: Normal range of motion.      Cervical back: Normal range of motion and neck supple.     Neurological: He is alert and oriented to person, place, and time. He has normal strength.   Skin: Skin is warm and dry. Capillary refill takes less than 2 seconds.   Psychiatric: He has a normal mood and affect. Thought content normal.         ED Course   Procedures  Labs Reviewed   MAGNESIUM - Abnormal; Notable for the following components:       Result Value    Magnesium 2.5 (*)     All other components within normal limits   URINALYSIS, REFLEX TO URINE CULTURE - Abnormal; Notable for the following components:    Protein, UA Trace (*)     Ketones, UA 15  (*)     Specific Gravity, UA >=1.030 (*)     All other components within  normal limits   CBC WITH DIFFERENTIAL - Abnormal; Notable for the following components:    WBC 12.44 (*)     MCH 32.8 (*)     Neutrophils % 76.4 (*)     Lymphocytes % 13.2 (*)     Monocytes % 9.3 (*)     Eosinophils % 0.5 (*)     Neutrophils, Abs 9.50 (*)     Monocytes, Absolute 1.16 (*)     All other components within normal limits   AMYLASE - Normal   LIPASE - Normal   LACTIC ACID, PLASMA - Normal   CBC W/ AUTO DIFFERENTIAL    Narrative:     The following orders were created for panel order CBC auto differential.  Procedure                               Abnormality         Status                     ---------                               -----------         ------                     CBC with Differential[[id number]]        Abnormal            Final result                 Please view results for these tests on the individual orders.   COMPREHENSIVE METABOLIC PANEL          Imaging Results          CT Abdomen Pelvis  Without Contrast (In process)                XR ABDOMEN, ACUTE 2 OR MORE VIEWS WITH CHEST (Final result)  Result time 06/08/22 20:58:49    Final result by Lane Hoffman MD (06/08/22 20:58:49)                 Impression:      No acute process    Moderate retained stool in the colon      Electronically signed by: Lane Hoffman  Date:    06/08/2022  Time:    20:58             Narrative:    EXAMINATION:  XR ABDOMEN ACUTE 2 OR MORE VIEWS WITH CHEST    CLINICAL HISTORY:  abdominal pain;.    COMPARISON:  Abdominal x-ray March 22, 2022    TECHNIQUE:  PA chest with AP supine and erect views abdomen    FINDINGS:  The cardiac silhouette is not enlarged.  There is no mediastinal mass.  There is no pulmonary vascular engorgement.  Lungs and pleural spaces are clear.  There is no pleural effusion.    There is no evidence of pneumoperitoneum.  Bowel gas pattern is nonobstructive.  There is moderate retained stool in the colon.  There is no radiopaque calculus.  There is no acute osseous abnormality                                  Medications   sodium chloride 0.9% bolus 1,000 mL (0 mLs Intravenous Stopped 6/8/22 2234)   morphine injection 4 mg (4 mg Intravenous Given 6/8/22 2033)   ondansetron injection 4 mg (4 mg Intravenous Given 6/8/22 2032)   ciprofloxacin (CIPRO)400mg/200ml D5W IVPB 400 mg (0 mg Intravenous Stopped 6/8/22 2341)                Attending Attestation:           Physician Attestation for Scribe:  Physician Attestation Statement for Scribe #1: I, Arnel Lopez MD, reviewed documentation, as scribed by Ade Billy in my presence, and it is both accurate and complete.                      Clinical Impression:   Final diagnoses:  [K57.92] Diverticulitis (Primary)          ED Disposition Condition    Discharge Stable        ED Prescriptions     None        Follow-up Information     Follow up With Specialties Details Why Contact Info    [facility] Medicine  As needed [address]  [facility] [address]  [phone]             Arnel Lopez MD  06/08/22 7505

## 2022-09-30 ENCOUNTER — OFFICE VISIT (OUTPATIENT)
Dept: FAMILY MEDICINE | Facility: CLINIC | Age: 57
End: 2022-09-30
Payer: OTHER GOVERNMENT

## 2022-09-30 VITALS
RESPIRATION RATE: 18 BRPM | OXYGEN SATURATION: 96 % | WEIGHT: 187.63 LBS | DIASTOLIC BLOOD PRESSURE: 70 MMHG | SYSTOLIC BLOOD PRESSURE: 120 MMHG | HEART RATE: 67 BPM | BODY MASS INDEX: 27.79 KG/M2 | HEIGHT: 69 IN | TEMPERATURE: 98 F

## 2022-09-30 DIAGNOSIS — E78.5 HYPERLIPIDEMIA, UNSPECIFIED HYPERLIPIDEMIA TYPE: ICD-10-CM

## 2022-09-30 DIAGNOSIS — Z79.899 LONG TERM USE OF DRUG: ICD-10-CM

## 2022-09-30 DIAGNOSIS — J01.00 ACUTE NON-RECURRENT MAXILLARY SINUSITIS: Primary | ICD-10-CM

## 2022-09-30 PROCEDURE — 96372 THER/PROPH/DIAG INJ SC/IM: CPT | Mod: ,,, | Performed by: NURSE PRACTITIONER

## 2022-09-30 PROCEDURE — 96372 PR INJECTION,THERAP/PROPH/DIAG2ST, IM OR SUBCUT: ICD-10-PCS | Mod: ,,, | Performed by: NURSE PRACTITIONER

## 2022-09-30 PROCEDURE — 99213 OFFICE O/P EST LOW 20 MIN: CPT | Mod: 25,,, | Performed by: NURSE PRACTITIONER

## 2022-09-30 PROCEDURE — 99213 PR OFFICE/OUTPT VISIT, EST, LEVL III, 20-29 MIN: ICD-10-PCS | Mod: 25,,, | Performed by: NURSE PRACTITIONER

## 2022-09-30 RX ORDER — METHYLPREDNISOLONE ACETATE 40 MG/ML
40 INJECTION, SUSPENSION INTRA-ARTICULAR; INTRALESIONAL; INTRAMUSCULAR; SOFT TISSUE
Status: COMPLETED | OUTPATIENT
Start: 2022-09-30 | End: 2022-09-30

## 2022-09-30 RX ORDER — DOXYCYCLINE 100 MG/1
100 CAPSULE ORAL 2 TIMES DAILY
Qty: 20 CAPSULE | Refills: 0 | Status: SHIPPED | OUTPATIENT
Start: 2022-09-30 | End: 2022-12-06

## 2022-09-30 RX ORDER — ROSUVASTATIN CALCIUM 40 MG/1
40 TABLET, COATED ORAL NIGHTLY
Qty: 90 TABLET | Refills: 1 | Status: SHIPPED | OUTPATIENT
Start: 2022-09-30 | End: 2022-12-06 | Stop reason: SDUPTHER

## 2022-09-30 RX ORDER — DEXAMETHASONE SODIUM PHOSPHATE 4 MG/ML
4 INJECTION, SOLUTION INTRA-ARTICULAR; INTRALESIONAL; INTRAMUSCULAR; INTRAVENOUS; SOFT TISSUE
Status: COMPLETED | OUTPATIENT
Start: 2022-09-30 | End: 2022-09-30

## 2022-09-30 RX ORDER — CEFTRIAXONE 1 G/1
1 INJECTION, POWDER, FOR SOLUTION INTRAMUSCULAR; INTRAVENOUS
Status: COMPLETED | OUTPATIENT
Start: 2022-09-30 | End: 2022-09-30

## 2022-09-30 RX ADMIN — METHYLPREDNISOLONE ACETATE 40 MG: 40 INJECTION, SUSPENSION INTRA-ARTICULAR; INTRALESIONAL; INTRAMUSCULAR; SOFT TISSUE at 10:09

## 2022-09-30 RX ADMIN — DEXAMETHASONE SODIUM PHOSPHATE 4 MG: 4 INJECTION, SOLUTION INTRA-ARTICULAR; INTRALESIONAL; INTRAMUSCULAR; INTRAVENOUS; SOFT TISSUE at 10:09

## 2022-09-30 RX ADMIN — CEFTRIAXONE 1 G: 1 INJECTION, POWDER, FOR SOLUTION INTRAMUSCULAR; INTRAVENOUS at 10:09

## 2022-09-30 NOTE — PROGRESS NOTES
CLARENCE Fields   [facility]  [address]  [place] [address]     PATIENT NAME: Linden Chávez  : 1965  DATE: 22  MRN: [id number]      Billing Provider: CLARENCE Fields  Level of Service: AR OFFICE/OUTPT VISIT, EST, LEVL III, 20-29 MIN  Patient PCP Information       Provider PCP Type    CLARENCE Fields General            Reason for Visit / Chief Complaint: Nasal Congestion (Runny nose and sneezing started Saturday night.), Sore Throat (.  Denies fever, n/v, diarrhea, body aches or loss of taste or smell.), and Cough (Productive cough with green sputum.)       Update PCP  Update Chief Complaint         History of Present Illness / Problem Focused Workflow     Linden Chávez presents to the clinic c/o congestion, runny nose, sore throat and productive cough with green sputum for the past 5 days.       Review of Systems     Review of Systems   Constitutional: Negative.  Negative for activity change, appetite change, chills, fatigue, fever and unexpected weight change.   HENT:  Positive for nasal congestion and postnasal drip.    Eyes:  Negative for visual disturbance.   Respiratory:  Negative for cough, chest tightness and shortness of breath.    Cardiovascular:  Negative for chest pain and leg swelling.   Gastrointestinal:  Negative for abdominal pain, blood in stool, change in bowel habit, nausea, vomiting and change in bowel habit.   Endocrine: Negative for cold intolerance, heat intolerance, polydipsia, polyphagia and polyuria.   Genitourinary:  Negative for frequency.   Integumentary:  Negative for rash.   Neurological:  Negative for dizziness, weakness and headaches.      Medical / Social / Family History     Past Medical History:   Diagnosis Date    Actinic keratoses     BCC (basal cell carcinoma of skin) 2019    left inferior posterior helix, Dr. Palacio    BPH (benign prostatic hyperplasia)     DDD (degenerative disc disease), thoracolumbar     GERD  "(gastroesophageal reflux disease)     Hyperlipidemia     Sleep apnea        Past Surgical History:   Procedure Laterality Date    BASAL CELL CARCINOMA EXCISION Left     ear    COLONOSCOPY  08/09/2017    SKIN BIOPSY  10/10/2018       Social History    reports that he has never smoked. He has never been exposed to tobacco smoke. His smokeless tobacco use includes snuff. He reports current alcohol use of about 14.0 standard drinks per week. He reports that he does not use drugs.    Family History  's family history includes Arthritis in his brother; Cancer in his father and mother; Heart disease in his father; Hyperlipidemia in his father; Melanoma in his father.    Medications and Allergies     Medications  Outpatient Medications Marked as Taking for the 9/30/22 encounter (Office Visit) with CLARENCE Farah   Medication Sig Dispense Refill    tadalafiL (CIALIS) 20 MG Tab Take 1 tablet (20 mg total) by mouth once daily. 30 tablet 5    [DISCONTINUED] rosuvastatin (CRESTOR) 40 MG Tab Take 1 tablet (40 mg total) by mouth every evening. 90 tablet 0     Current Facility-Administered Medications for the 9/30/22 encounter (Office Visit) with CLARENCE Farah   Medication Dose Route Frequency Provider Last Rate Last Admin    cefTRIAXone injection 1 g  1 g Intramuscular 1 time in Clinic/HOD CLARENCE Faarh        dexamethasone injection 4 mg  4 mg Intramuscular 1 time in Clinic/HOD CLRAENCE Farah        methylPREDNISolone acetate injection 40 mg  40 mg Intramuscular 1 time in Clinic/HOD CLARENCE Farah           Allergies  Review of patient's allergies indicates:  No Known Allergies    Physical Examination     Vitals:    09/30/22 0943   BP: 120/70   BP Location: Right arm   Patient Position: Sitting   BP Method: Large (Manual)   Pulse: 67   Resp: 18   Temp: 98.3 °F (36.8 °C)   TempSrc: Oral   SpO2: 96%   Weight: 85.1 kg (187 lb 9.6 oz)   Height: 5' 9" (1.753 m)      Physical " Exam  Constitutional:       Appearance: Normal appearance.   HENT:      Head: Normocephalic.      Right Ear: Hearing and ear canal normal. No tenderness. Tympanic membrane is not injected.      Left Ear: Hearing and ear canal normal. No tenderness. Tympanic membrane is not injected.      Nose: Congestion and rhinorrhea present. No nasal deformity.      Right Turbinates: Not swollen.      Left Turbinates: Not swollen.      Right Sinus: Maxillary sinus tenderness present.      Left Sinus: Maxillary sinus tenderness present.      Mouth/Throat:      Lips: Pink.      Mouth: Mucous membranes are moist.      Pharynx: No oropharyngeal exudate or posterior oropharyngeal erythema.      Tonsils: No tonsillar exudate.   Eyes:      General: Lids are normal. No allergic shiner.        Right eye: No discharge.         Left eye: No discharge.      Conjunctiva/sclera:      Right eye: Right conjunctiva is not injected.      Left eye: Left conjunctiva is not injected.   Neck:      Trachea: Trachea normal.   Cardiovascular:      Rate and Rhythm: Normal rate and regular rhythm.      Pulses: Normal pulses.      Heart sounds: Normal heart sounds.   Pulmonary:      Effort: Pulmonary effort is normal.      Breath sounds: Normal breath sounds.   Abdominal:      General: Bowel sounds are normal.      Palpations: Abdomen is soft.   Musculoskeletal:      Cervical back: Neck supple.   Lymphadenopathy:      Cervical: Cervical adenopathy present.   Skin:     General: Skin is warm and dry.      Capillary Refill: Capillary refill takes 2 to 3 seconds.   Neurological:      Mental Status: He is alert.        Assessment and Plan (including Health Maintenance)      Problem List  Smart Sets  Document Outside HM   :          Health Maintenance Due   Topic Date Due    Influenza Vaccine (1) 09/01/2022       Problem List Items Addressed This Visit    None  Visit Diagnoses       Acute non-recurrent maxillary sinusitis    -  Primary    Will treat with  rocephin, decadron and depomedrol and doxycycline for 10 days. Increase fluid intake.    Relevant Medications    cefTRIAXone injection 1 g (Start on 9/30/2022 10:15 AM)    dexamethasone injection 4 mg (Start on 9/30/2022 10:15 AM)    methylPREDNISolone acetate injection 40 mg (Start on 9/30/2022 10:15 AM)    doxycycline (VIBRAMYCIN) 100 MG Cap    Hyperlipidemia, unspecified hyperlipidemia type        Relevant Medications    rosuvastatin (CRESTOR) 40 MG Tab    Other Relevant Orders    Lipid Panel    Comprehensive Metabolic Panel    Long term use of drug        Relevant Orders    Comprehensive Metabolic Panel          Acute non-recurrent maxillary sinusitis  Comments:  Will treat with rocephin, decadron and depomedrol and doxycycline for 10 days. Increase fluid intake.  Orders:  -     cefTRIAXone injection 1 g  -     dexamethasone injection 4 mg  -     methylPREDNISolone acetate injection 40 mg  -     doxycycline (VIBRAMYCIN) 100 MG Cap; Take 1 capsule (100 mg total) by mouth 2 (two) times daily.  Dispense: 20 capsule; Refill: 0    Hyperlipidemia, unspecified hyperlipidemia type  -     Lipid Panel; Future; Expected date: 09/30/2022  -     rosuvastatin (CRESTOR) 40 MG Tab; Take 1 tablet (40 mg total) by mouth every evening.  Dispense: 90 tablet; Refill: 1  -     Comprehensive Metabolic Panel; Future; Expected date: 09/30/2022    Long term use of drug  -     Comprehensive Metabolic Panel; Future; Expected date: 09/30/2022     Health Maintenance Topics with due status: Not Due       Topic Last Completion Date    Colorectal Cancer Screening 08/09/2017    TETANUS VACCINE 01/24/2018    Lipid Panel 03/22/2022       Procedures          Follow up in about 1 week (around 10/7/2022).     Signature:  CLARENCE Fields    Date of encounter: 9/30/22

## 2022-12-05 NOTE — PROGRESS NOTES
CLARENCE Fields   [facility]  [address]  [place] [address]     PATIENT NAME: Linden Chávez  : 1965  DATE: 22  MRN: [id number]      Billing Provider: CLARENCE Fields  Level of Service: ID OFFICE/OUTPT VISIT, EST, LEVL III, 20-29 MIN  Patient PCP Information       Provider PCP Type    CLARENCE Fields General            Reason for Visit / Chief Complaint: Abdominal Pain (Lower abdominal pain x 3 days. Pt states he believes he has diverticulitis again. ), Diarrhea (Pt states had diarrhea Friday and Saturday), and Fever (Pt states he ran a fever on Friday)       Update PCP  Update Chief Complaint         History of Present Illness / Problem Focused Workflow     Linden Chávez presents to the clinic c/o lower midline abd pain for the past 3 days; has had diverticulitis in the past and states symptoms are similar. Denies N/V      Review of Systems     Review of Systems   Constitutional:  Positive for fever. Negative for activity change, appetite change, chills, fatigue and unexpected weight change.   HENT:  Negative for nasal congestion, sinus pressure/congestion and sore throat.    Eyes:  Negative for visual disturbance.   Respiratory:  Negative for cough, chest tightness and shortness of breath.    Cardiovascular:  Negative for chest pain and leg swelling.   Gastrointestinal:  Positive for abdominal pain and diarrhea. Negative for change in bowel habit, constipation, nausea, vomiting and change in bowel habit.   Endocrine: Negative for cold intolerance, heat intolerance, polydipsia, polyphagia and polyuria.   Genitourinary:  Negative for dysuria, frequency and testicular pain.   Integumentary:  Negative for rash.   Neurological:  Negative for dizziness, weakness and headaches.      Medical / Social / Family History     Past Medical History:   Diagnosis Date    Actinic keratoses     BCC (basal cell carcinoma of skin) 2019    left inferior posterior helix, Dr. Palacio     "BPH (benign prostatic hyperplasia)     DDD (degenerative disc disease), thoracolumbar     Diverticulitis     GERD (gastroesophageal reflux disease)     Hyperlipidemia     Sleep apnea        Past Surgical History:   Procedure Laterality Date    BASAL CELL CARCINOMA EXCISION Left     ear    COLONOSCOPY  08/09/2017    SKIN BIOPSY  10/10/2018       Social History    reports that he has never smoked. He has never been exposed to tobacco smoke. His smokeless tobacco use includes snuff. He reports current alcohol use of about 14.0 standard drinks per week. He reports that he does not use drugs.    Family History  's family history includes Arthritis in his brother; Cancer in his father and mother; Heart disease in his father; Hyperlipidemia in his father; Melanoma in his father.    Medications and Allergies     Medications  Outpatient Medications Marked as Taking for the 12/6/22 encounter (Office Visit) with CLARENCE Farah   Medication Sig Dispense Refill    [DISCONTINUED] rosuvastatin (CRESTOR) 40 MG Tab Take 1 tablet (40 mg total) by mouth every evening. 90 tablet 1    [DISCONTINUED] tadalafiL (CIALIS) 20 MG Tab Take 1 tablet (20 mg total) by mouth once daily. 30 tablet 5     Current Facility-Administered Medications for the 12/6/22 encounter (Office Visit) with CLARENCE Farah   Medication Dose Route Frequency Provider Last Rate Last Admin    [COMPLETED] cefTRIAXone injection 1 g  1 g Intramuscular 1 time in Clinic/HOD CLARENCE Farah   1 g at 12/06/22 0920       Allergies  Review of patient's allergies indicates:  No Known Allergies    Physical Examination     Vitals:    12/06/22 0829   BP: 102/68   BP Location: Right arm   Patient Position: Sitting   BP Method: Medium (Manual)   Pulse: 86   Resp: 18   Temp: 97.7 °F (36.5 °C)   TempSrc: Temporal   SpO2: 97%   Weight: 83 kg (183 lb)   Height: 5' 9" (1.753 m)      Physical Exam  Constitutional:       General: He is not in acute distress.     " Appearance: Normal appearance.   Eyes:      Conjunctiva/sclera: Conjunctivae normal.   Neck:      Thyroid: No thyromegaly.      Vascular: No carotid bruit.   Cardiovascular:      Rate and Rhythm: Normal rate and regular rhythm.      Pulses: Normal pulses.      Heart sounds: Normal heart sounds. No murmur heard.  Pulmonary:      Effort: Pulmonary effort is normal. No accessory muscle usage or respiratory distress.      Breath sounds: Normal breath sounds. No wheezing, rhonchi or rales.   Abdominal:      General: Bowel sounds are normal. There is no distension.      Palpations: Abdomen is soft. There is no mass.      Tenderness: There is abdominal tenderness (midline mid to lower abd). There is no rebound.      Hernia: No hernia is present.       Musculoskeletal:         General: Normal range of motion.      Cervical back: Neck supple.   Skin:     General: Skin is warm and dry.      Capillary Refill: Capillary refill takes less than 2 seconds.      Coloration: Skin is not jaundiced or pale.   Neurological:      Mental Status: He is alert and oriented to person, place, and time.      Gait: Gait is intact.   Psychiatric:         Behavior: Behavior normal.         Thought Content: Thought content normal.        Assessment and Plan (including Health Maintenance)      Problem List  Smart Sets  Document Outside HM   :          Health Maintenance Due   Topic Date Due    Influenza Vaccine (1) 09/01/2022       Problem List Items Addressed This Visit          GI    Diverticulitis - Primary    Relevant Medications    cefTRIAXone injection 1 g (Completed)    ciprofloxacin HCl (CIPRO) 500 MG tablet    metroNIDAZOLE (FLAGYL) 500 MG tablet     Other Visit Diagnoses       Hyperlipidemia, unspecified hyperlipidemia type        Continue current medication and LFLC diet. LDL goal < 70    Relevant Medications    rosuvastatin (CRESTOR) 40 MG Tab    Erectile dysfunction, unspecified erectile dysfunction type        Relevant Medications     tadalafiL (CIALIS) 20 MG Tab          Diverticulitis  Comments:  Will treat with rocephin injection along with cipro and flagyl. Clear liquid diet for 48 hours.  Orders:  -     cefTRIAXone injection 1 g  -     ciprofloxacin HCl (CIPRO) 500 MG tablet; Take 1 tablet (500 mg total) by mouth every 12 (twelve) hours.  Dispense: 14 tablet; Refill: 0  -     metroNIDAZOLE (FLAGYL) 500 MG tablet; Take 1 tablet (500 mg total) by mouth every 12 (twelve) hours.  Dispense: 14 tablet; Refill: 0    Hyperlipidemia, unspecified hyperlipidemia type  Comments:  Continue current medication and LFLC diet. LDL goal < 70  Orders:  -     rosuvastatin (CRESTOR) 40 MG Tab; Take 1 tablet (40 mg total) by mouth every evening.  Dispense: 90 tablet; Refill: 1    Erectile dysfunction, unspecified erectile dysfunction type  -     tadalafiL (CIALIS) 20 MG Tab; Take 1 tablet (20 mg total) by mouth once daily.  Dispense: 30 tablet; Refill: 5     Health Maintenance Topics with due status: Not Due       Topic Last Completion Date    Colorectal Cancer Screening 08/09/2017    TETANUS VACCINE 01/24/2018    Lipid Panel 03/22/2022       Procedures            Follow up if symptoms worsen or fail to improve in 48 hours or go to ER.     Signature:  CLARENCE Fields    Date of encounter: 12/6/22

## 2022-12-06 ENCOUNTER — OFFICE VISIT (OUTPATIENT)
Dept: FAMILY MEDICINE | Facility: CLINIC | Age: 57
End: 2022-12-06
Payer: OTHER GOVERNMENT

## 2022-12-06 VITALS
SYSTOLIC BLOOD PRESSURE: 102 MMHG | WEIGHT: 183 LBS | BODY MASS INDEX: 27.11 KG/M2 | TEMPERATURE: 98 F | HEART RATE: 86 BPM | RESPIRATION RATE: 18 BRPM | HEIGHT: 69 IN | OXYGEN SATURATION: 97 % | DIASTOLIC BLOOD PRESSURE: 68 MMHG

## 2022-12-06 DIAGNOSIS — E78.5 HYPERLIPIDEMIA, UNSPECIFIED HYPERLIPIDEMIA TYPE: ICD-10-CM

## 2022-12-06 DIAGNOSIS — K57.92 DIVERTICULITIS: Primary | ICD-10-CM

## 2022-12-06 DIAGNOSIS — N52.9 ERECTILE DYSFUNCTION, UNSPECIFIED ERECTILE DYSFUNCTION TYPE: ICD-10-CM

## 2022-12-06 PROCEDURE — 96372 THER/PROPH/DIAG INJ SC/IM: CPT | Mod: ,,, | Performed by: NURSE PRACTITIONER

## 2022-12-06 PROCEDURE — 99213 OFFICE O/P EST LOW 20 MIN: CPT | Mod: 25,,, | Performed by: NURSE PRACTITIONER

## 2022-12-06 PROCEDURE — 96372 PR INJECTION,THERAP/PROPH/DIAG2ST, IM OR SUBCUT: ICD-10-PCS | Mod: ,,, | Performed by: NURSE PRACTITIONER

## 2022-12-06 PROCEDURE — 99213 PR OFFICE/OUTPT VISIT, EST, LEVL III, 20-29 MIN: ICD-10-PCS | Mod: 25,,, | Performed by: NURSE PRACTITIONER

## 2022-12-06 RX ORDER — METRONIDAZOLE 500 MG/1
500 TABLET ORAL EVERY 12 HOURS
Qty: 14 TABLET | Refills: 0 | Status: SHIPPED | OUTPATIENT
Start: 2022-12-06 | End: 2022-12-08 | Stop reason: SDUPTHER

## 2022-12-06 RX ORDER — CIPROFLOXACIN 500 MG/1
500 TABLET ORAL EVERY 12 HOURS
Qty: 14 TABLET | Refills: 0 | Status: SHIPPED | OUTPATIENT
Start: 2022-12-06

## 2022-12-06 RX ORDER — TADALAFIL 20 MG/1
20 TABLET ORAL DAILY
Qty: 30 TABLET | Refills: 5 | Status: SHIPPED | OUTPATIENT
Start: 2022-12-06 | End: 2023-12-06

## 2022-12-06 RX ORDER — CEFTRIAXONE 1 G/1
1 INJECTION, POWDER, FOR SOLUTION INTRAMUSCULAR; INTRAVENOUS
Status: COMPLETED | OUTPATIENT
Start: 2022-12-06 | End: 2022-12-06

## 2022-12-06 RX ORDER — ROSUVASTATIN CALCIUM 40 MG/1
40 TABLET, COATED ORAL NIGHTLY
Qty: 90 TABLET | Refills: 1 | Status: SHIPPED | OUTPATIENT
Start: 2022-12-06 | End: 2023-06-05 | Stop reason: SDUPTHER

## 2022-12-06 RX ADMIN — CEFTRIAXONE 1 G: 1 INJECTION, POWDER, FOR SOLUTION INTRAMUSCULAR; INTRAVENOUS at 09:12

## 2022-12-08 DIAGNOSIS — K57.92 DIVERTICULITIS: ICD-10-CM

## 2022-12-08 RX ORDER — METRONIDAZOLE 500 MG/1
500 TABLET ORAL EVERY 12 HOURS
Qty: 14 TABLET | Refills: 0 | Status: SHIPPED | OUTPATIENT
Start: 2022-12-08

## 2023-01-18 ENCOUNTER — TELEPHONE (OUTPATIENT)
Dept: GASTROENTEROLOGY | Facility: CLINIC | Age: 58
End: 2023-01-18
Payer: OTHER GOVERNMENT

## 2023-03-08 ENCOUNTER — OFFICE VISIT (OUTPATIENT)
Dept: DERMATOLOGY | Facility: CLINIC | Age: 58
End: 2023-03-08
Payer: OTHER GOVERNMENT

## 2023-03-08 DIAGNOSIS — L82.1 SK (SEBORRHEIC KERATOSIS): ICD-10-CM

## 2023-03-08 DIAGNOSIS — L57.0 AK (ACTINIC KERATOSIS): ICD-10-CM

## 2023-03-08 DIAGNOSIS — L57.8 OTHER SKIN CHANGES DUE TO CHRONIC EXPOSURE TO NONIONIZING RADIATION: Primary | ICD-10-CM

## 2023-03-08 DIAGNOSIS — D48.9 NEOPLASM OF UNCERTAIN BEHAVIOR: ICD-10-CM

## 2023-03-08 DIAGNOSIS — Z85.828 HISTORY OF NONMELANOMA SKIN CANCER: ICD-10-CM

## 2023-03-08 PROCEDURE — 11102 TANGNTL BX SKIN SINGLE LES: CPT | Mod: ,,, | Performed by: DERMATOLOGY

## 2023-03-08 PROCEDURE — 88305 TISSUE EXAM BY PATHOLOGIST: CPT | Mod: 26,,, | Performed by: PATHOLOGY

## 2023-03-08 PROCEDURE — 99213 OFFICE O/P EST LOW 20 MIN: CPT | Mod: 25,,, | Performed by: DERMATOLOGY

## 2023-03-08 PROCEDURE — 17000 DESTRUCT PREMALG LESION: CPT | Mod: XS,,, | Performed by: DERMATOLOGY

## 2023-03-08 PROCEDURE — 88305 PATHOLOGY, DERMATOLOGY: ICD-10-PCS | Mod: 26,,, | Performed by: PATHOLOGY

## 2023-03-08 PROCEDURE — 88341 PATHOLOGY, DERMATOLOGY: ICD-10-PCS | Mod: 26,,, | Performed by: PATHOLOGY

## 2023-03-08 PROCEDURE — 88342 PATHOLOGY, DERMATOLOGY: ICD-10-PCS | Mod: 26,,, | Performed by: PATHOLOGY

## 2023-03-08 PROCEDURE — 88341 IMHCHEM/IMCYTCHM EA ADD ANTB: CPT | Mod: 26,,, | Performed by: PATHOLOGY

## 2023-03-08 PROCEDURE — 88305 TISSUE EXAM BY PATHOLOGIST: CPT | Mod: TC,SUR | Performed by: DERMATOLOGY

## 2023-03-08 PROCEDURE — 17003 DESTRUCTION, PREMALIGNANT LESIONS; SECOND THROUGH 14 LESIONS: ICD-10-PCS | Mod: ,,, | Performed by: DERMATOLOGY

## 2023-03-08 PROCEDURE — 17000 PR DESTRUCTION(LASER SURGERY,CRYOSURGERY,CHEMOSURGERY),PREMALIGNANT LESIONS,FIRST LESION: ICD-10-PCS | Mod: XS,,, | Performed by: DERMATOLOGY

## 2023-03-08 PROCEDURE — 88342 IMHCHEM/IMCYTCHM 1ST ANTB: CPT | Mod: 26,,, | Performed by: PATHOLOGY

## 2023-03-08 PROCEDURE — 11102 PR TANGENTIAL BIOPSY, SKIN, SINGLE LESION: ICD-10-PCS | Mod: ,,, | Performed by: DERMATOLOGY

## 2023-03-08 PROCEDURE — 17003 DESTRUCT PREMALG LES 2-14: CPT | Mod: ,,, | Performed by: DERMATOLOGY

## 2023-03-08 PROCEDURE — 99213 PR OFFICE/OUTPT VISIT, EST, LEVL III, 20-29 MIN: ICD-10-PCS | Mod: 25,,, | Performed by: DERMATOLOGY

## 2023-03-08 NOTE — PROGRESS NOTES
[facility]   Gretchen Pinto MD    Patient Name: Linden Chávez  Patient YOB: 1965   Date of Service: 3/8/23    CC: Full Skin Exam    HPI: Linden Chávez is a 57 y.o. male presents today for a full skin exam.  Patient was last seen 11/2020 and dermatologic history includes Aks and BCC. Patient is concerned today about a lesion located on the bilateral ears.  It has been present for 3 year(s). It has been treated in the past.      Past Medical History:   Diagnosis Date    Actinic keratoses     BCC (basal cell carcinoma of skin) 04/09/2019    left inferior posterior helix, Dr. Palacio    BPH (benign prostatic hyperplasia)     DDD (degenerative disc disease), thoracolumbar     Diverticulitis     GERD (gastroesophageal reflux disease)     Hyperlipidemia     Sleep apnea      Past Surgical History:   Procedure Laterality Date    BASAL CELL CARCINOMA EXCISION Left     ear    COLONOSCOPY  08/09/2017    SKIN BIOPSY  10/10/2018     Review of patient's allergies indicates:  No Known Allergies    Current Outpatient Medications:     ciprofloxacin HCl (CIPRO) 500 MG tablet, Take 1 tablet (500 mg total) by mouth every 12 (twelve) hours., Disp: 14 tablet, Rfl: 0    methocarbamoL (ROBAXIN) 500 MG Tab, Take 500 mg by mouth 4 (four) times daily as needed., Disp: , Rfl:     metroNIDAZOLE (FLAGYL) 500 MG tablet, Take 1 tablet (500 mg total) by mouth every 12 (twelve) hours., Disp: 14 tablet, Rfl: 0    rosuvastatin (CRESTOR) 40 MG Tab, Take 1 tablet (40 mg total) by mouth every evening., Disp: 90 tablet, Rfl: 1    tadalafiL (CIALIS) 20 MG Tab, Take 1 tablet (20 mg total) by mouth once daily., Disp: 30 tablet, Rfl: 5    ROS: A focused review of systems was obtained and negative.     Exam: A full skin exam was performed including scalp, hair, face, neck, chest, back, abdomen, right arm, left arm, right hand, left hand, nails, right leg, and left leg.  All areas examined were normal expect as per below in assessment and  plan.  General Appearance of the patient is well developed and well nourished.  Orientation: alert and oriented x 3.  Mood and affect: pleasant.    Assessment:   The primary encounter diagnosis was Other skin changes due to chronic exposure to nonionizing radiation. Diagnoses of Neoplasm of uncertain behavior, AK (actinic keratosis), SK (seborrheic keratosis), and History of nonmelanoma skin cancer were also pertinent to this visit.    Plan:        Seborrheic Keratosis (L82.1)  - Stuck-on, warty, greasy brown papule with pseudo-horn cysts scattered on the trunk and extremities    Plan: Counseling.  I counseled the patient regarding the following:  Skin Care: Seborrheic Keratoses are benign. No treatment is necessary.  Expectations: Seborrheic Keratoses are benign warty growths. Patients get more of them as they age    Plan: Reassurance    Actinic Keratoses(L57.0)  - Erythematous patches and papules with hyperkeratotic scale distributed on the bilateral ears.    Plan: Counseling.  I counseled the patient regarding the following:  Skin Care: Sun protective clothing and broad spectrum sunscreen can prevent the formation of Actinic  Keratoses. AKs can resolve with cryotherapy, photodynamic therapy, imiquimod, topical 5-FU.  Expectations: Actinic Keratoses are precancerous proliferations that occur within sun damaged skin. If untreated,  a small subset of AKs can develop into Squamous Cell Carcinoma.  Contact Office if: If AKs fail to resolve despite treatment, or if you develop a side effect from therapy, such as  unbearable crusting, scabbing, redness and tenderness.    Plan: Liquid Nitrogen.  A total of 4 lesions were treated with liquid nitrogen for 2 freeze-thaw cycles lasting 5 seconds, located on the above locations.   The patient's consent was obtained including but not limited to risks of crusting, scabbing,  blistering, scarring, darker or lighter pigmentary change, recurrence, incomplete removal and  infection.    Neoplasm of Uncertain Behavior (D48.5)  - Brown irregular macule located on the left chest  Ddx includes: Nevus vs dysplastic nevus r/o melanoma    Plan: Counseling.  I counseled the patient regarding the following:  Instructions: Neoplasms of Uncertain Behavior can be observed, biopsied or surgically removed depending on the  level of clinical suspicion.  Instructions: Neoplasms of Uncertain Behavior can be observed, biopsied or surgically removed depending on the  level of clinical suspicion.  Contact Office if: patient develops any new lesions that fail to heal, ulcerate or bleed.    Plan: Biopsy by Shave Method.  Location (1): Left chest  Written consent was obtained and risks were reviewed including but not  limited to scarring, infection, bleeding, scabbing, incomplete removal, nerve damage and allergy to anesthesia.  The area was prepped with Chloraprep. Local anesthesia was obtained with approximately 0.5cc of 1% lidocaine  with epinephrine. A biopsy by shave method to the level of the dermis (sent for H and E) was performed using  a Dermablade on the above location. Aluminum chloride was used for hemostasis. Following the biopsy  Petrolatum and a bandage were applied. Patient will be notified of biopsy results. However, patient instructed to  call the office if not contacted within 2 weeks.     History of non-melanoma skin cancer (Z85.828)  - Well healed scar with NER  Associated diagnosis: Medical surveillance following completed treatment    Plan: Monitoring.    Other Skin Changes Due to Chronic Exposure of Nonionizing Radiation (L57.8)    Plan: Monitoring.     Plan: Sunscreen Recommendations.  I recommended a broad spectrum sunscreen with a SPF of 30 or higher. I explained that SPF 30 sunscreens block approximately 97 percent of the  sun's harmful rays. Sunscreens should be applied at least 15 minutes prior to expected sun exposure and then every 2 hours after that as long as  sun exposure  continues. If swimming or exercising sunscreen should be reapplied every 45 minutes to an hour after getting wet or sweating. One  ounce, or the equivalent of a shot glass full of sunscreen, is adequate to protect the skin not covered by a bathing suit. I also recommended a lip  balm with a sunscreen as well. Sun protective clothing can be used in lieu of sunscreen but must be worn the entire time you are exposed to the  sun's rays.      Follow up in about 1 year (around 3/8/2024) for FSE.    Gretchen Pinto MD

## 2023-03-08 NOTE — PATIENT INSTRUCTIONS
Sunscreen Recommendations  I recommended a broad spectrum sunscreen with a SPF of 30 or higher that is water-resistant. SPF 30 sunscreens block approximately 97 percent of the sun's harmful rays.   Sunscreens should be applied at least 15 minutes prior to expected sun exposure and then every 90 minutes after that as long as sun exposure continues.   If swimming or exercising sunscreen should be reapplied every 45 minutes to an hour after getting wet or sweating.  One ounce, or the equivalent of a shot glass full of sunscreen, is adequate to protect the skin not covered by a bathing suit.   I also recommended a lip balm with a sunscreen as well.   Healthy Sun Protective Behaviors  Sun protective clothing can be used in lieu of sunscreen but must be worn the entire time you are exposed to the sun's rays.  Seek shade between 10 a.m. and 2 p.m.  Use extra caution near water, snow, or sand as they reflect sun rays  Avoid tanning beds and consider sunless self-tanning products instead  Perform monthly self skin exams    Biopsy Site Care  Starting tomorrow you may shower and wash the area with dial antibacterial soap  Pat dry and apply vaseline and a bandaid  Perform this routine for three days  The area will be irritated, sore, slightly red, and may itch, sting, or burn  Do not apply make-up to the area until it is healed  There will be a scar  The area will take 1-2 weeks to heal  No soaking in the tub or hot tub for one week     Cryotherapy  There will likely be a blister.   Clean the area daily with dial antibacterial soap and water.   Apply vaseline as needed.   The area will take 1-2 weeks to heal.

## 2023-03-10 LAB
DHEA SERPL-MCNC: NORMAL
ESTROGEN SERPL-MCNC: NORMAL PG/ML
INSULIN SERPL-ACNC: NORMAL U[IU]/ML
LAB AP GROSS DESCRIPTION: NORMAL
LAB AP LABORATORY NOTES: NORMAL
LAB AP SPEC A DDX: NORMAL
LAB AP SPEC A MORPHOLOGY: NORMAL
LAB AP SPEC A PROCEDURE: NORMAL
T3RU NFR SERPL: NORMAL %

## 2023-05-08 ENCOUNTER — OFFICE VISIT (OUTPATIENT)
Dept: FAMILY MEDICINE | Facility: CLINIC | Age: 58
End: 2023-05-08
Payer: OTHER GOVERNMENT

## 2023-05-08 VITALS
HEART RATE: 76 BPM | OXYGEN SATURATION: 98 % | WEIGHT: 178 LBS | RESPIRATION RATE: 17 BRPM | TEMPERATURE: 98 F | SYSTOLIC BLOOD PRESSURE: 118 MMHG | BODY MASS INDEX: 26.36 KG/M2 | DIASTOLIC BLOOD PRESSURE: 70 MMHG | HEIGHT: 69 IN

## 2023-05-08 DIAGNOSIS — H61.21 IMPACTED CERUMEN OF RIGHT EAR: Primary | ICD-10-CM

## 2023-05-08 PROCEDURE — 69210 EAR CERUMEN REMOVAL: ICD-10-PCS | Mod: ,,, | Performed by: NURSE PRACTITIONER

## 2023-05-08 PROCEDURE — 69210 REMOVE IMPACTED EAR WAX UNI: CPT | Mod: ,,, | Performed by: NURSE PRACTITIONER

## 2023-05-08 RX ORDER — HYDROCORTISONE AND ACETIC ACID 20.75; 10.375 MG/ML; MG/ML
3 SOLUTION AURICULAR (OTIC) 2 TIMES DAILY
Qty: 10 ML | Refills: 0 | Status: SHIPPED | OUTPATIENT
Start: 2023-05-08 | End: 2023-05-08

## 2023-05-08 RX ORDER — LIDOCAINE 50 MG/G
PATCH TOPICAL
COMMUNITY
Start: 2022-12-27

## 2023-05-08 RX ORDER — MELOXICAM 15 MG/1
15 TABLET ORAL
COMMUNITY
Start: 2022-12-27

## 2023-05-08 RX ORDER — ACETAMINOPHEN 325 MG/1
325 TABLET ORAL
COMMUNITY
Start: 2022-12-27

## 2023-05-08 RX ORDER — HYDROCORTISONE AND ACETIC ACID 20.75; 10.375 MG/ML; MG/ML
3 SOLUTION AURICULAR (OTIC) 2 TIMES DAILY
Qty: 10 ML | Refills: 0 | Status: SHIPPED | OUTPATIENT
Start: 2023-05-08 | End: 2023-05-18

## 2023-05-08 RX ORDER — DICLOFENAC SODIUM 10 MG/G
GEL TOPICAL
COMMUNITY
Start: 2023-02-06

## 2023-05-08 NOTE — PROGRESS NOTES
Chantal Abraham NP   [facility]  [address]  [place]  [address]      PATIENT NAME: Linden Chávez  : 1965  DATE: 23  MRN: [id number]      Billing Provider: Chantal Abraham NP  Level of Service: MD OFFICE/OUTPT VISIT, EST, LEVL III, 20-29 MIN  Patient PCP Information       Provider PCP Type    Chantal Abraham NP General            Reason for Visit / Chief Complaint: Cerumen Impaction (Right ear. Started last Thursday.  Starting to hurt a little (soreness).)         History of Present Illness / Problem Focused Workflow     Linden Chávez presents to the clinic with Cerumen Impaction (Right ear. Started last Thursday.  Starting to hurt a little (soreness).)     57 year old male presents to clinic with complaints of cerumen impaction to right ear. States it started to feel very full and then became painful last Thursday. Denies other problems.      Review of Systems     @Review of Systems   Constitutional:  Negative for activity change, appetite change, fatigue and fever.   HENT:  Positive for ear pain. Negative for nasal congestion, rhinorrhea, sinus pressure/congestion and sore throat.    Eyes:  Negative for pain, redness, visual disturbance and eye dryness.   Respiratory:  Negative for cough and shortness of breath.    Cardiovascular:  Negative for chest pain and leg swelling.   Gastrointestinal:  Negative for abdominal distention, abdominal pain, constipation and diarrhea.   Endocrine: Negative for cold intolerance, heat intolerance and polyuria.   Genitourinary:  Negative for bladder incontinence, dysuria, frequency and urgency.   Musculoskeletal:  Negative for arthralgias, gait problem and myalgias.   Integumentary:  Negative for color change, rash and wound.   Allergic/Immunologic: Negative for environmental allergies and food allergies.   Neurological:  Negative for dizziness, weakness, light-headedness and headaches.   Psychiatric/Behavioral:  Negative for behavioral problems and  sleep disturbance.      Medical / Social / Family History     Past Medical History:   Diagnosis Date    Actinic keratoses     BCC (basal cell carcinoma of skin) 04/09/2019    left inferior posterior helix, Dr. Palacio    BPH (benign prostatic hyperplasia)     DDD (degenerative disc disease), thoracolumbar     Diverticulitis     GERD (gastroesophageal reflux disease)     Hyperlipidemia     Sleep apnea        Past Surgical History:   Procedure Laterality Date    BASAL CELL CARCINOMA EXCISION Left     ear    COLONOSCOPY  08/09/2017    SKIN BIOPSY  10/10/2018       Social History    reports that he has never smoked. He has never been exposed to tobacco smoke. His smokeless tobacco use includes snuff. He reports current alcohol use of about 14.0 standard drinks per week. He reports that he does not use drugs.    Family History  's family history includes Arthritis in his brother; Cancer in his father and mother; Heart disease in his father; Hyperlipidemia in his father; Melanoma in his father.    Medications and Allergies     Medications  Outpatient Medications Marked as Taking for the 5/8/23 encounter (Office Visit) with Chantal Abraham NP   Medication Sig Dispense Refill    acetaminophen (TYLENOL) 325 MG tablet 325 mg.      diclofenac sodium (VOLTAREN) 1 % Gel APPLY 4 GRAMS TO AFFECTED AREA(S) FOUR TIMES A DAY FOR ARTHRITIS      LIDOcaine (LIDODERM) 5 % APPLY 1 PATCH TO AFFECTED AREA(S) DAILY FOR LOCAL PAIN (NOTE: APPLY 12 HOURS ON AND 12 HOURS OFF)      meloxicam (MOBIC) 15 MG tablet 15 mg.      methocarbamoL (ROBAXIN) 500 MG Tab Take 500 mg by mouth 4 (four) times daily as needed.      tadalafiL (CIALIS) 20 MG Tab Take 1 tablet (20 mg total) by mouth once daily. 30 tablet 5    [DISCONTINUED] rosuvastatin (CRESTOR) 40 MG Tab Take 1 tablet (40 mg total) by mouth every evening. 90 tablet 1       Allergies  Review of patient's allergies indicates:  No Known Allergies    Physical Examination     Vitals:    05/08/23  1509   BP: 118/70   Pulse: 76   Resp: 17   Temp: 98.3 °F (36.8 °C)     Physical Exam  Vitals and nursing note reviewed.   HENT:      Head: Normocephalic.      Right Ear: Tympanic membrane normal. There is impacted cerumen.      Left Ear: Tympanic membrane normal.      Nose: Nose normal.      Mouth/Throat:      Mouth: Mucous membranes are moist.      Pharynx: Oropharynx is clear. No posterior oropharyngeal erythema.   Eyes:      Conjunctiva/sclera: Conjunctivae normal.   Cardiovascular:      Rate and Rhythm: Normal rate and regular rhythm.      Pulses: Normal pulses.      Heart sounds: Normal heart sounds.   Pulmonary:      Effort: Pulmonary effort is normal.      Breath sounds: Normal breath sounds.   Abdominal:      General: Abdomen is flat. Bowel sounds are normal. There is no distension.      Palpations: Abdomen is soft.   Musculoskeletal:         General: No swelling or tenderness. Normal range of motion.      Cervical back: Normal range of motion.      Right lower leg: No edema.      Left lower leg: No edema.   Skin:     General: Skin is warm and dry.      Capillary Refill: Capillary refill takes less than 2 seconds.   Neurological:      Mental Status: He is alert. Mental status is at baseline.   Psychiatric:         Mood and Affect: Mood normal.         Behavior: Behavior normal.             Lab Results   Component Value Date    WBC 12.44 (H) 06/08/2022    HGB 15.6 06/08/2022    HCT 44.8 06/08/2022    MCV 94.3 06/08/2022     06/08/2022        CMP  Sodium   Date Value Ref Range Status   06/08/2022 140 136 - 145 mmol/L Final     Potassium   Date Value Ref Range Status   06/08/2022 4.0 3.5 - 5.1 mmol/L Final     Chloride   Date Value Ref Range Status   06/08/2022 106 98 - 107 mmol/L Final     CO2   Date Value Ref Range Status   06/08/2022 26 21 - 32 mmol/L Final     Glucose   Date Value Ref Range Status   06/08/2022 89 74 - 106 mg/dL Final     BUN   Date Value Ref Range Status   06/08/2022 11 7 - 18 mg/dL  Final     Creatinine   Date Value Ref Range Status   06/08/2022 0.79 0.70 - 1.30 mg/dL Final     Calcium   Date Value Ref Range Status   06/08/2022 9.2 8.5 - 10.1 mg/dL Final     Total Protein   Date Value Ref Range Status   06/08/2022 7.3 6.4 - 8.2 g/dL Final     Albumin   Date Value Ref Range Status   06/08/2022 4.0 3.5 - 5.0 g/dL Final     Bilirubin, Total   Date Value Ref Range Status   06/08/2022 1.1 0.0 - 1.2 mg/dL Final     Alk Phos   Date Value Ref Range Status   06/08/2022 92 45 - 115 U/L Final     AST   Date Value Ref Range Status   06/08/2022 25 15 - 37 U/L Final     ALT   Date Value Ref Range Status   06/08/2022 34 16 - 61 U/L Final     Anion Gap   Date Value Ref Range Status   06/08/2022 12 7 - 16 mmol/L Final     Ear Cerumen Removal    Date/Time: 5/8/2023 3:15 PM  Performed by: Chantal Abraham NP  Authorized by: Chantal Abraham NP     Consent Done?:  Not Needed    Local anesthetic:  None  Medication Used:  Other  Location details:  Both ears  Procedure type: curette and irrigation    Cerumen  Removal Results:  Cerumen completely removed  Patient tolerance:  Patient tolerated the procedure well with no immediate complications   Assessment and Plan (including Health Maintenance)   :    Plan:           Problem List Items Addressed This Visit    None  Visit Diagnoses       Impacted cerumen of right ear    -  Primary    Relevant Orders    Ear Cerumen Removal            Health Maintenance Topics with due status: Not Due       Topic Last Completion Date    Colorectal Cancer Screening 08/09/2017    TETANUS VACCINE 01/24/2018    Lipid Panel 03/22/2022       Future Appointments   Date Time Provider Department Center   3/11/2024 10:45 AM Gretchen Pinto MD [facility]        Health Maintenance Due   Topic Date Due    Hemoglobin A1c (Diabetic Prevention Screening)  Never done    Shingles Vaccine (1 of 2) Never done    COVID-19 Vaccine (3 - Moderna series) 03/30/2021        No follow-ups on file.      Signature:  Chantal Abraham NP  [address] [address]  [place]  [address]    Date of encounter: 5/8/23

## 2023-06-05 DIAGNOSIS — E78.5 HYPERLIPIDEMIA, UNSPECIFIED HYPERLIPIDEMIA TYPE: ICD-10-CM

## 2023-06-05 RX ORDER — ROSUVASTATIN CALCIUM 40 MG/1
40 TABLET, COATED ORAL NIGHTLY
Qty: 30 TABLET | Refills: 0 | Status: SHIPPED | OUTPATIENT
Start: 2023-06-05

## 2023-06-05 NOTE — TELEPHONE ENCOUNTER
I will send in 1 month. He needs to make appt for follow up and lab work. He has not had a lipid panel since march 2022

## 2023-07-21 DIAGNOSIS — Z12.11 COLON CANCER SCREENING: Primary | ICD-10-CM

## 2024-02-06 ENCOUNTER — PATIENT MESSAGE (OUTPATIENT)
Dept: GASTROENTEROLOGY | Facility: CLINIC | Age: 59
End: 2024-02-06
Payer: OTHER GOVERNMENT

## 2024-02-08 ENCOUNTER — ANESTHESIA (OUTPATIENT)
Dept: GASTROENTEROLOGY | Facility: HOSPITAL | Age: 59
End: 2024-02-08
Payer: OTHER GOVERNMENT

## 2024-02-08 ENCOUNTER — ANESTHESIA EVENT (OUTPATIENT)
Dept: GASTROENTEROLOGY | Facility: HOSPITAL | Age: 59
End: 2024-02-08
Payer: OTHER GOVERNMENT

## 2024-02-08 ENCOUNTER — HOSPITAL ENCOUNTER (OUTPATIENT)
Dept: GASTROENTEROLOGY | Facility: HOSPITAL | Age: 59
Discharge: HOME | End: 2024-02-08
Attending: NURSE PRACTITIONER
Payer: OTHER GOVERNMENT

## 2024-02-08 VITALS
SYSTOLIC BLOOD PRESSURE: 97 MMHG | DIASTOLIC BLOOD PRESSURE: 61 MMHG | RESPIRATION RATE: 14 BRPM | HEART RATE: 63 BPM | TEMPERATURE: 98 F | OXYGEN SATURATION: 97 %

## 2024-02-08 DIAGNOSIS — Z12.11 COLON CANCER SCREENING: ICD-10-CM

## 2024-02-08 DIAGNOSIS — Z83.719 FAMILY HISTORY OF COLONIC POLYPS: ICD-10-CM

## 2024-02-08 DIAGNOSIS — K57.30 DIVERTICULOSIS OF COLON: ICD-10-CM

## 2024-02-08 DIAGNOSIS — Z86.010 HISTORY OF COLON POLYPS: Primary | ICD-10-CM

## 2024-02-08 PROBLEM — Z86.0100 HISTORY OF COLON POLYPS: Status: ACTIVE | Noted: 2024-02-08

## 2024-02-08 PROCEDURE — 63600175 PHARM REV CODE 636 W HCPCS: Performed by: NURSE ANESTHETIST, CERTIFIED REGISTERED

## 2024-02-08 PROCEDURE — G0105 COLORECTAL SCRN; HI RISK IND: HCPCS | Performed by: INTERNAL MEDICINE

## 2024-02-08 PROCEDURE — G0105 COLORECTAL SCRN; HI RISK IND: HCPCS | Mod: ,,, | Performed by: INTERNAL MEDICINE

## 2024-02-08 PROCEDURE — 00812 ANES LWR INTST SCR COLSC: CPT

## 2024-02-08 PROCEDURE — D9220A PRA ANESTHESIA: Mod: ,,, | Performed by: NURSE ANESTHETIST, CERTIFIED REGISTERED

## 2024-02-08 PROCEDURE — 25000003 PHARM REV CODE 250: Performed by: NURSE ANESTHETIST, CERTIFIED REGISTERED

## 2024-02-08 PROCEDURE — 37000008 HC ANESTHESIA 1ST 15 MINUTES

## 2024-02-08 RX ORDER — PROPOFOL 10 MG/ML
VIAL (ML) INTRAVENOUS
Status: DISCONTINUED | OUTPATIENT
Start: 2024-02-08 | End: 2024-02-08

## 2024-02-08 RX ORDER — LIDOCAINE HYDROCHLORIDE 20 MG/ML
INJECTION, SOLUTION EPIDURAL; INFILTRATION; INTRACAUDAL; PERINEURAL
Status: DISCONTINUED | OUTPATIENT
Start: 2024-02-08 | End: 2024-02-08

## 2024-02-08 RX ORDER — SODIUM CHLORIDE 0.9 % (FLUSH) 0.9 %
10 SYRINGE (ML) INJECTION
Status: DISCONTINUED | OUTPATIENT
Start: 2024-02-08 | End: 2024-02-09 | Stop reason: HOSPADM

## 2024-02-08 RX ADMIN — LIDOCAINE HYDROCHLORIDE 80 MG: 20 INJECTION, SOLUTION INTRAVENOUS at 09:02

## 2024-02-08 RX ADMIN — PROPOFOL 30 MG: 10 INJECTION, EMULSION INTRAVENOUS at 09:02

## 2024-02-08 RX ADMIN — PROPOFOL 80 MG: 10 INJECTION, EMULSION INTRAVENOUS at 09:02

## 2024-02-08 RX ADMIN — SODIUM CHLORIDE: 9 INJECTION, SOLUTION INTRAVENOUS at 09:02

## 2024-02-08 RX ADMIN — LIDOCAINE HYDROCHLORIDE 20 MG: 20 INJECTION, SOLUTION INTRAVENOUS at 09:02

## 2024-02-08 NOTE — DISCHARGE INSTRUCTIONS
Procedure Date  2/8/24     Impression  Overall Impression:   Diverticulosis in the descending colon and sigmoid colon  No colon polyps were seen. Diverticula were noted in the sigmoid and descending colon.     Recommendation    Repeat colonoscopy in 7 years      Discharge: disp: DC to home. High fiber diet. No driving x 24 hours. Follow-up with PCP as scheduled.  Dx: History of colon polyps; family history of colon polyps; colon diverticulosis.     Indication  Colon cancer screening; history of colon polyps; family history of colon polyps     NO DRIVING, OPERATING EQUIPMENT, OR SIGNING LEGAL DOCUMENTS FOR 24 HOURS.

## 2024-02-08 NOTE — TRANSFER OF CARE
Anesthesia Transfer of Care Note    Patient: Linden Chávez    Procedure(s) Performed: * No procedures listed *    Patient location: GI    Anesthesia Type: general    Transport from OR: Transported from OR on room air with adequate spontaneous ventilation    Post pain: adequate analgesia    Post assessment: no apparent anesthetic complications    Post vital signs: stable    Level of consciousness: responds to stimulation and sedated    Nausea/Vomiting: no nausea/vomiting    Complications: none    Transfer of care protocol was followed      Last vitals: Visit Vitals  BP 97/61 (BP Location: Left arm, Patient Position: Lying)   Pulse 63   Temp 36.6 °C (97.9 °F) (Oral)   Resp 14   SpO2 97%

## 2024-02-08 NOTE — H&P
[facility]  Gastroenterology  H&P    Patient Name: Linden Chávez  MRN: [id number]  Admission Date: 2/8/2024  Code Status: Full Code    Attending Provider: Kacey Jones,Mark   Primary Care Physician: Chantal Abraham NP  Principal Problem:<principal problem not specified>    Subjective:     History of Present Illness: Pt's last colonoscopy was 2017 with removal of ta polyp. He also has family history of colon polyps.    Past Medical History:   Diagnosis Date    Actinic keratoses     BCC (basal cell carcinoma of skin) 04/09/2019    left inferior posterior helix, Dr. Palacio    BPH (benign prostatic hyperplasia)     DDD (degenerative disc disease), thoracolumbar     Diverticulitis     GERD (gastroesophageal reflux disease)     Hyperlipidemia     Sleep apnea        Past Surgical History:   Procedure Laterality Date    BASAL CELL CARCINOMA EXCISION Left     ear    COLONOSCOPY  08/09/2017    SKIN BIOPSY  10/10/2018       Review of patient's allergies indicates:  No Known Allergies  Family History       Problem Relation (Age of Onset)    Arthritis Brother    Cancer Father, Mother    Heart disease Father    Hyperlipidemia Father    Melanoma Father          Tobacco Use    Smoking status: Never     Passive exposure: Never    Smokeless tobacco: Current     Types: Snuff   Substance and Sexual Activity    Alcohol use: Yes     Alcohol/week: 14.0 standard drinks of alcohol     Types: 14 Cans of beer per week     Comment: Social    Drug use: Never    Sexual activity: Yes     Partners: Female     Review of Systems   Respiratory: Negative.     Cardiovascular: Negative.    Gastrointestinal: Negative.      Objective:     Vital Signs (Most Recent):  Pulse: 65 (02/08/24 0748)  Resp: 20 (02/08/24 0748)  BP: 128/80 (02/08/24 0748)  SpO2: (!) 94 % (02/08/24 0748) Vital Signs (24h Range):  Pulse:  [65] 65  Resp:  [20] 20  SpO2:  [94 %] 94 %  BP: (128)/(80) 128/80        There is no height or weight on file to calculate  "BMI.    No intake or output data in the 24 hours ending 02/08/24 0906    Lines/Drains/Airways       Peripheral Intravenous Line  Duration                  Peripheral IV - Single Lumen 02/08/24 0747 Right Antecubital <1 day                    Physical Exam  Vitals reviewed.   Constitutional:       General: He is not in acute distress.     Appearance: Normal appearance. He is well-developed. He is not ill-appearing.   HENT:      Head: Normocephalic and atraumatic.      Nose: Nose normal.   Eyes:      Pupils: Pupils are equal, round, and reactive to light.   Cardiovascular:      Rate and Rhythm: Normal rate and regular rhythm.   Pulmonary:      Effort: Pulmonary effort is normal.      Breath sounds: Normal breath sounds. No wheezing.   Abdominal:      General: Abdomen is flat. Bowel sounds are normal. There is no distension.      Palpations: Abdomen is soft.      Tenderness: There is no abdominal tenderness. There is no guarding.   Skin:     General: Skin is warm and dry.      Coloration: Skin is not jaundiced.   Neurological:      Mental Status: He is alert.   Psychiatric:         Attention and Perception: Attention normal.         Mood and Affect: Affect normal.         Speech: Speech normal.         Behavior: Behavior is cooperative.      Comments: Pt was calm while speaking.         Significant Labs:  CBC: No results for input(s): "WBC", "HGB", "HCT", "PLT" in the last 48 hours.  CMP: No results for input(s): "GLU", "CALCIUM", "ALBUMIN", "PROT", "NA", "K", "CO2", "CL", "BUN", "CREATININE", "ALKPHOS", "ALT", "AST", "BILITOT" in the last 48 hours.    Significant Imaging:  Imaging results within the past 24 hours have been reviewed.    Assessment/Plan:     There are no hospital problems to display for this patient.        Imp: history of colon polyp; family history of colon polyps  Plan: colonoscopy    Melchor Ashraf MD  Gastroenterology  [facility]  "

## 2024-02-08 NOTE — ANESTHESIA PREPROCEDURE EVALUATION
02/08/2024  Linden Chávez is a 58 y.o., male.      Pre-op Assessment    I have reviewed the Patient Summary Reports.     I have reviewed the Nursing Notes. I have reviewed the NPO Status.   I have reviewed the Medications.     Review of Systems  Anesthesia Hx:  No problems with previous Anesthesia                Social:  Dip/Chew, Social Alcohol Use       Cardiovascular:                hyperlipidemia                             Pulmonary:        Sleep Apnea Does not use CPAP          Education provided regarding risk of obstructive sleep apnea  Medical reason for not educating patient about risks of obstructive sleep apnea          Renal/:    BPH              Hepatic/GI:  Bowel Prep.   GERD             Musculoskeletal:  Arthritis                 Actinic keratoses BCC (basal cell carcinoma of skin)   BPH (benign prostatic hyperplasia) GERD (gastroesophageal reflux disease)   Hyperlipidemia Sleep apnea   DDD (degenerative disc disease), thoracolumbar Diverticulitis       Physical Exam  General: Well nourished, Cooperative, Alert and Oriented    Airway:  Mallampati: II   Mouth Opening: Normal  Neck ROM: Normal ROM    Dental:  Intact    Chest/Lungs:  Normal Respiratory Rate    Heart:  Rate: Normal  Rhythm: Regular Rhythm        Anesthesia Plan  Type of Anesthesia, risks & benefits discussed:    Anesthesia Type: Gen Natural Airway, MAC  Intra-op Monitoring Plan: Standard ASA Monitors  Post Op Pain Control Plan: multimodal analgesia  Induction:  IV  Informed Consent: Informed consent signed with the Patient and all parties understand the risks and agree with anesthesia plan.  All questions answered.   ASA Score: 2  Day of Surgery Review of History & Physical: H&P Update referred to the surgeon/provider.I have interviewed and examined the patient. I have reviewed the patient's H&P dated: There are no significant  changes.     Ready For Surgery From Anesthesia Perspective.     .

## 2024-02-08 NOTE — ANESTHESIA POSTPROCEDURE EVALUATION
Anesthesia Post Evaluation    Patient: Linden Chávez    Procedure(s) Performed: * No procedures listed *    Final Anesthesia Type: general      Patient location during evaluation: GI PACU  Patient participation: Yes- Able to Participate  Level of consciousness: awake and alert  Post-procedure vital signs: reviewed and stable  Pain management: adequate  Airway patency: patent    PONV status at discharge: No PONV  Anesthetic complications: no      Cardiovascular status: blood pressure returned to baseline and hemodynamically stable  Respiratory status: spontaneous ventilation  Hydration status: euvolemic  Follow-up not needed.              Vitals Value Taken Time   /59 02/08/24 0953   Temp 36.6 °C (97.9 °F) 02/08/24 0928   Pulse 57 02/08/24 0953   Resp 10 02/08/24 0953   SpO2 97 % 02/08/24 0953   Vitals shown include unvalidated device data.      No case tracking events are documented in the log.      Pain/Antony Score: Antony Score: 10 (2/8/2024  9:45 AM)

## 2024-03-08 ENCOUNTER — TELEPHONE (OUTPATIENT)
Dept: FAMILY MEDICINE | Facility: CLINIC | Age: 59
End: 2024-03-08
Payer: OTHER GOVERNMENT

## 2024-03-08 DIAGNOSIS — Z85.828 HX OF NONMELANOMA SKIN CANCER: Primary | ICD-10-CM

## 2024-03-08 NOTE — TELEPHONE ENCOUNTER
Pt called requesting a referral to Dr. Gretchen Pinto with [facility], he has his annual follow up scheduled for Monday, 3/11/2024 and needs a  Referral.

## 2024-03-11 ENCOUNTER — OFFICE VISIT (OUTPATIENT)
Dept: DERMATOLOGY | Facility: CLINIC | Age: 59
End: 2024-03-11
Payer: OTHER GOVERNMENT

## 2024-03-11 VITALS — WEIGHT: 178 LBS | RESPIRATION RATE: 18 BRPM | HEIGHT: 69 IN | BODY MASS INDEX: 26.36 KG/M2

## 2024-03-11 DIAGNOSIS — L82.1 SEBORRHEIC KERATOSES: ICD-10-CM

## 2024-03-11 DIAGNOSIS — L91.0 KELOID OF SKIN: ICD-10-CM

## 2024-03-11 DIAGNOSIS — Z85.828 HISTORY OF BASAL CELL CARCINOMA OF SKIN: ICD-10-CM

## 2024-03-11 DIAGNOSIS — Z85.828 HX OF NONMELANOMA SKIN CANCER: ICD-10-CM

## 2024-03-11 DIAGNOSIS — D48.5 NEOPLASM OF UNCERTAIN BEHAVIOR OF SKIN: ICD-10-CM

## 2024-03-11 DIAGNOSIS — L57.8 OTHER SKIN CHANGES DUE TO CHRONIC EXPOSURE TO NONIONIZING RADIATION: Primary | ICD-10-CM

## 2024-03-11 DIAGNOSIS — L57.0 ACTINIC KERATOSES: ICD-10-CM

## 2024-03-11 PROCEDURE — 17003 DESTRUCT PREMALG LES 2-14: CPT | Mod: ,,, | Performed by: DERMATOLOGY

## 2024-03-11 PROCEDURE — 88305 TISSUE EXAM BY PATHOLOGIST: CPT | Mod: TC,SUR | Performed by: DERMATOLOGY

## 2024-03-11 PROCEDURE — 88305 TISSUE EXAM BY PATHOLOGIST: CPT | Mod: 26,,, | Performed by: PATHOLOGY

## 2024-03-11 PROCEDURE — 17000 DESTRUCT PREMALG LESION: CPT | Mod: XS,,, | Performed by: DERMATOLOGY

## 2024-03-11 PROCEDURE — 11102 TANGNTL BX SKIN SINGLE LES: CPT | Mod: ,,, | Performed by: DERMATOLOGY

## 2024-03-11 PROCEDURE — 99213 OFFICE O/P EST LOW 20 MIN: CPT | Mod: 25,,, | Performed by: DERMATOLOGY

## 2024-03-11 NOTE — PATIENT INSTRUCTIONS
Sunscreen Recommendations  I recommended a broad spectrum sunscreen with a SPF of 30 or higher that is water-resistant. SPF 30 sunscreens block approximately 97 percent of the sun's harmful rays.   Sunscreens should be applied at least 15 minutes prior to expected sun exposure and then every 90 minutes after that as long as sun exposure continues.   If swimming or exercising sunscreen should be reapplied every 45 minutes to an hour after getting wet or sweating.  One ounce, or the equivalent of a shot glass full of sunscreen, is adequate to protect the skin not covered by a bathing suit.   I also recommended a lip balm with a sunscreen as well.   Healthy Sun Protective Behaviors  Sun protective clothing can be used in lieu of sunscreen but must be worn the entire time you are exposed to the sun's rays.  Seek shade between 10 a.m. and 2 p.m.  Use extra caution near water, snow, or sand as they reflect sun rays  Avoid tanning beds and consider sunless self-tanning products instead  Perform monthly self skin exams     Cryotherapy  There will likely be a blister.   Clean the area daily with dial antibacterial soap and water.   Apply vaseline as needed.   The area will take 1-2 weeks to heal.  Biopsy Site Care  Starting tomorrow you may shower and wash the area with dial antibacterial soap  Pat dry and apply vaseline and a bandaid  Perform this routine for three days  The area will be irritated, sore, slightly red, and may itch, sting, or burn  Do not apply make-up to the area until it is healed  There will be a scar  The area will take 1-2 weeks to heal  No soaking in the tub or hot tub for one week

## 2024-03-11 NOTE — PROGRESS NOTES
[facility]   Gretchen Pinto MD    Patient Name: Linden Chávez  Patient YOB: 1965   Date of Service: 3/11/24    CC: Full Skin Exam    HPI: Linden Chávez is a 58 y.o. male presents today for a full skin exam.  Patient was last seen 3/8/2023 and dermatologic history includes AK's and BCC. Patient is concerned today about a lesion located on the left forearm.  It has been present for 4 month(s). It has not been treated in the past.  Patient is also concerned about lesion located on the left ear.    Past Medical History:   Diagnosis Date    Actinic keratoses     BCC (basal cell carcinoma of skin) 04/09/2019    left inferior posterior helix, Dr. Palacio    BPH (benign prostatic hyperplasia)     DDD (degenerative disc disease), thoracolumbar     Diverticulitis     GERD (gastroesophageal reflux disease)     Hyperlipidemia     Sleep apnea      Past Surgical History:   Procedure Laterality Date    BASAL CELL CARCINOMA EXCISION Left     ear    COLONOSCOPY  08/09/2017    SKIN BIOPSY  10/10/2018     Review of patient's allergies indicates:  No Known Allergies    Current Outpatient Medications:     acetaminophen (TYLENOL) 325 MG tablet, 325 mg., Disp: , Rfl:     ciprofloxacin HCl (CIPRO) 500 MG tablet, Take 1 tablet (500 mg total) by mouth every 12 (twelve) hours. (Patient not taking: Reported on 5/8/2023), Disp: 14 tablet, Rfl: 0    diclofenac sodium (VOLTAREN) 1 % Gel, APPLY 4 GRAMS TO AFFECTED AREA(S) FOUR TIMES A DAY FOR ARTHRITIS, Disp: , Rfl:     LIDOcaine (LIDODERM) 5 %, APPLY 1 PATCH TO AFFECTED AREA(S) DAILY FOR LOCAL PAIN (NOTE: APPLY 12 HOURS ON AND 12 HOURS OFF), Disp: , Rfl:     meloxicam (MOBIC) 15 MG tablet, 15 mg., Disp: , Rfl:     methocarbamoL (ROBAXIN) 500 MG Tab, Take 500 mg by mouth 4 (four) times daily as needed., Disp: , Rfl:     metroNIDAZOLE (FLAGYL) 500 MG tablet, Take 1 tablet (500 mg total) by mouth every 12 (twelve) hours. (Patient not taking: Reported on 5/8/2023), Disp: 14 tablet,  Rfl: 0    rosuvastatin (CRESTOR) 40 MG Tab, Take 1 tablet (40 mg total) by mouth every evening., Disp: 30 tablet, Rfl: 0    tadalafiL (CIALIS) 20 MG Tab, Take 1 tablet (20 mg total) by mouth once daily., Disp: 30 tablet, Rfl: 5    ROS: A focused review of systems was obtained and negative.     Exam: A full skin exam was performed including scalp, hair, face, neck, chest, back, abdomen, right arm, left arm, right hand, left hand, nails, right leg, and left leg.  All areas examined were normal expect as per below in assessment and plan.  General Appearance of the patient is well developed and well nourished.  Orientation: alert and oriented x 3.  Mood and affect: pleasant.    Assessment:   The primary encounter diagnosis was Other skin changes due to chronic exposure to nonionizing radiation. Diagnoses of Seborrheic keratoses, History of basal cell carcinoma of skin, Hx of nonmelanoma skin cancer, Keloid of skin, Actinic keratoses, and Neoplasm of uncertain behavior of skin were also pertinent to this visit.    Plan:      Seborrheic Keratosis (L82.1)  - Stuck-on, warty, greasy brown papule with pseudo-horn cysts scattered on the trunk and extremities    Plan: Counseling.  I counseled the patient regarding the following:  Skin Care: Seborrheic Keratoses are benign. No treatment is necessary.  Expectations: Seborrheic Keratoses are benign warty growths. Patients get more of them as they age    Plan: Reassurance    Actinic Keratoses(L57.0)  - Erythematous patches and papules with hyperkeratotic scale distributed on the left ear, left forearm,  .    Plan: Counseling.  I counseled the patient regarding the following:  Skin Care: Sun protective clothing and broad spectrum sunscreen can prevent the formation of Actinic  Keratoses. AKs can resolve with cryotherapy, photodynamic therapy, imiquimod, topical 5-FU.  Expectations: Actinic Keratoses are precancerous proliferations that occur within sun damaged skin. If  untreated,  a small subset of AKs can develop into Squamous Cell Carcinoma.  Contact Office if: If AKs fail to resolve despite treatment, or if you develop a side effect from therapy, such as  unbearable crusting, scabbing, redness and tenderness.    Plan: Liquid Nitrogen.  A total of 2 lesions were treated with liquid nitrogen for 2 freeze-thaw cycles lasting 5 seconds, located on the above locations.   The patient's consent was obtained including but not limited to risks of crusting, scabbing,  blistering, scarring, darker or lighter pigmentary change, recurrence, incomplete removal and infection.    Benign Nevus (D22.72)  - Dome shaped regular papule    Plan: Reassurance.    Plan: Counseling.  I counseled the patient regarding the following:  Instructions: Monthly self-skin checks to monitor for any changes in moles are recommended.  Expectations: Benign Nevi are pigmented nests of cells within the skin. No treatment is necessary.  Contact Office if: Any moles change in size, shape or color; itch, burn or bleed.    Keloid (91.0)  - firm telangiectatic tumor located on the left ear    Plan: Counseling  I counseled the patient regarding the following:  Skin Care: Keloids can be treated with intralesional steroids, imiquimod, excisional surgery in combination with either steroids or XRT.  Expectations: Keloid are thickened, and some times painful or itchy. Symptoms respond well to treatment.  Contact Office if: If Keloids fail to resolve, grow rapidly or become painful or itchy.    - Will refer to Dr. Palacio for keloid located on the left helix for excision of NMSC    Neoplasm of Uncertain Behavior (D48.5)  - pearly papule located on the left posterior neck  Ddx includes: BCC vs nevus    Plan: Counseling.  I counseled the patient regarding the following:  Instructions: Neoplasms of Uncertain Behavior can be observed, biopsied or surgically removed depending on the  level of clinical suspicion.  Instructions:  Neoplasms of Uncertain Behavior can be observed, biopsied or surgically removed depending on the  level of clinical suspicion.  Contact Office if: patient develops any new lesions that fail to heal, ulcerate or bleed.    Plan: Biopsy by Shave Method.  Location (1): left posterior neck   Written consent was obtained and risks were reviewed including but not  limited to scarring, infection, bleeding, scabbing, incomplete removal, nerve damage and allergy to anesthesia.  The area was prepped with Chloraprep. Local anesthesia was obtained with approximately 0.5cc of 1% lidocaine  with epinephrine. A biopsy by shave method to the level of the dermis (sent for H and E) was performed using  a Dermablade on the above location. Aluminum chloride was used for hemostasis. Following the biopsy  Petrolatum and a bandage were applied. Patient will be notified of biopsy results. However, patient instructed to  call the office if not contacted within 2 weeks.    History of non-melanoma skin cancer (Z85.828)  - Well healed scar with NER  Associated diagnosis: Medical surveillance following completed treatment    Plan: Monitoring.    Other Skin Changes Due to Chronic Exposure of Nonionizing Radiation (L57.8)    Plan: Monitoring.     Plan: Sunscreen Recommendations.  I recommended a broad spectrum sunscreen with a SPF of 30 or higher. I explained that SPF 30 sunscreens block approximately 97 percent of the  sun's harmful rays. Sunscreens should be applied at least 15 minutes prior to expected sun exposure and then every 2 hours after that as long as  sun exposure continues. If swimming or exercising sunscreen should be reapplied every 45 minutes to an hour after getting wet or sweating. One  ounce, or the equivalent of a shot glass full of sunscreen, is adequate to protect the skin not covered by a bathing suit. I also recommended a lip  balm with a sunscreen as well. Sun protective clothing can be used in lieu of sunscreen but must  be worn the entire time you are exposed to the  sun's rays.      Follow up in about 1 year (around 3/11/2025) for Skin Check .    Gretchen Pinto MD

## 2024-03-13 LAB
ESTROGEN SERPL-MCNC: NORMAL PG/ML
INSULIN SERPL-ACNC: NORMAL U[IU]/ML
LAB AP GROSS DESCRIPTION: NORMAL
LAB AP LABORATORY NOTES: NORMAL
LAB AP SPEC A DDX: NORMAL
LAB AP SPEC A MORPHOLOGY: NORMAL
LAB AP SPEC A PROCEDURE: NORMAL
T3RU NFR SERPL: NORMAL %

## 2024-03-19 ENCOUNTER — PROCEDURE VISIT (OUTPATIENT)
Dept: DERMATOLOGY | Facility: CLINIC | Age: 59
End: 2024-03-19
Payer: OTHER GOVERNMENT

## 2024-03-19 DIAGNOSIS — L82.0 INFLAMED SEBORRHEIC KERATOSIS: ICD-10-CM

## 2024-03-19 DIAGNOSIS — C44.41 BASAL CELL CARCINOMA (BCC) OF SKIN OF NECK: Primary | ICD-10-CM

## 2024-03-19 PROCEDURE — 17110 DESTRUCTION B9 LES UP TO 14: CPT | Mod: XS,79,, | Performed by: DERMATOLOGY

## 2024-03-19 PROCEDURE — 99499 UNLISTED E&M SERVICE: CPT | Mod: ,,, | Performed by: DERMATOLOGY

## 2024-03-19 PROCEDURE — 17271 DSTR MAL LES S/N/H/F/G 0.6-1: CPT | Mod: 79,,, | Performed by: DERMATOLOGY

## 2024-03-19 NOTE — PROGRESS NOTES
[facility]   Gretchen Pinto MD    Patient Name: Linden Chávez  Patient YOB: 1965   Date of Service: 3/19/24    CC: EDC    HPI: Linden Chávez is a 58 y.o. male here today for EDC of BCC, located on the Left posterior neck.  Patient is also concerned with a lesion behind the left ear.     Past Medical History:   Diagnosis Date    Actinic keratoses     BCC (basal cell carcinoma of skin) 04/09/2019    left inferior posterior helix, Dr. Palacio    BPH (benign prostatic hyperplasia)     DDD (degenerative disc disease), thoracolumbar     Diverticulitis     GERD (gastroesophageal reflux disease)     Hyperlipidemia     Sleep apnea      Past Surgical History:   Procedure Laterality Date    BASAL CELL CARCINOMA EXCISION Left     ear    COLONOSCOPY  08/09/2017    SKIN BIOPSY  10/10/2018     Review of patient's allergies indicates:  No Known Allergies    Current Outpatient Medications:     acetaminophen (TYLENOL) 325 MG tablet, 325 mg., Disp: , Rfl:     ciprofloxacin HCl (CIPRO) 500 MG tablet, Take 1 tablet (500 mg total) by mouth every 12 (twelve) hours. (Patient not taking: Reported on 5/8/2023), Disp: 14 tablet, Rfl: 0    diclofenac sodium (VOLTAREN) 1 % Gel, APPLY 4 GRAMS TO AFFECTED AREA(S) FOUR TIMES A DAY FOR ARTHRITIS, Disp: , Rfl:     LIDOcaine (LIDODERM) 5 %, APPLY 1 PATCH TO AFFECTED AREA(S) DAILY FOR LOCAL PAIN (NOTE: APPLY 12 HOURS ON AND 12 HOURS OFF), Disp: , Rfl:     meloxicam (MOBIC) 15 MG tablet, 15 mg., Disp: , Rfl:     methocarbamoL (ROBAXIN) 500 MG Tab, Take 500 mg by mouth 4 (four) times daily as needed., Disp: , Rfl:     metroNIDAZOLE (FLAGYL) 500 MG tablet, Take 1 tablet (500 mg total) by mouth every 12 (twelve) hours. (Patient not taking: Reported on 5/8/2023), Disp: 14 tablet, Rfl: 0    rosuvastatin (CRESTOR) 40 MG Tab, Take 1 tablet (40 mg total) by mouth every evening., Disp: 30 tablet, Rfl: 0    tadalafiL (CIALIS) 20 MG Tab, Take 1 tablet (20 mg total) by mouth once daily., Disp:  30 tablet, Rfl: 5    ROS: A focused review of systems was obtained and negative.     Exam: A focused skin exam was performed and the biopsy site was identified.  General Appearance of the patient is well developed and well nourished.  Orientation: alert and oriented x 3.  Mood and affect: pleasant.    There were no vitals filed for this visit.    Assessment:   The primary encounter diagnosis was Basal cell carcinoma (BCC) of skin of neck. A diagnosis of Inflamed seborrheic keratosis was also pertinent to this visit.    Plan: Curettage and Destruction  Location: left posterior neck    Accession Number: [id number]  Initial Size: 0.6 x 0.7 cm  Final Size: 0.8 x 0.7 cm    Consent was obtained from the patient. The risks, benefits and alternatives to therapy were discussed in detail. Specifically, the risks of infection, scarring, bleeding, prolonged wound healing, nerve injury, incomplete removal, allergy to anesthesia and recurrence were addressed. Alternatives to ED&C, such as: surgical removal and XRT were also discussed. Prior to the procedure, the treatment site was clearly identified and confirmed by the patient and biopsy photo. All components of Universal Protocol/PAUSE Rule completed.    The photograph obtained at the biopsy was reviewed prior to proceeding with the procedure. The lesion was treated with Curettage and Destruction. The area was prepped with Chloraprep. Local anesthesia was achieved with 6 cc of 1% lidocaine with epinephrine. The tumor was removed by curettage and electrodesiccation. 3 cycles were required to remove all clinically apparent tumor. The size of the lesion after curettage was as listed above. The wound was cleaned, and a pressure dressing was applied. The patient received detailed post-op instructions.    Irritated Seborrheic Keratoses (L82.0)  Stuck-on inflamed papules with crust located behind the left ear  Associated diagnoses: Pruritus and Cutaneous Inflammation    Plan: Liquid  Nitrogen.  A total of 1 lesions were treated with liquid nitrogen, located on the above listed location.  This procedure was medically necessary because the lesions that were treated were: irritated and itchy. The  patient's consent was obtained including but not limited to risks of crusting, scabbing, blistering, scarring, darker  or lighter pigmentary change, recurrence, incomplete removal and infection.           Follow up in about 6 months (around 9/19/2024) for fse.    Gretchen Pinto MD

## 2025-03-19 ENCOUNTER — TELEPHONE (OUTPATIENT)
Dept: FAMILY MEDICINE | Facility: CLINIC | Age: 60
End: 2025-03-19
Payer: OTHER GOVERNMENT

## 2025-03-19 ENCOUNTER — OFFICE VISIT (OUTPATIENT)
Dept: FAMILY MEDICINE | Facility: CLINIC | Age: 60
End: 2025-03-19
Payer: OTHER GOVERNMENT

## 2025-03-19 VITALS
SYSTOLIC BLOOD PRESSURE: 94 MMHG | HEIGHT: 69 IN | WEIGHT: 191.38 LBS | TEMPERATURE: 98 F | HEART RATE: 83 BPM | RESPIRATION RATE: 20 BRPM | DIASTOLIC BLOOD PRESSURE: 57 MMHG | BODY MASS INDEX: 28.35 KG/M2 | OXYGEN SATURATION: 96 %

## 2025-03-19 DIAGNOSIS — K57.92 DIVERTICULITIS: ICD-10-CM

## 2025-03-19 PROCEDURE — 99213 OFFICE O/P EST LOW 20 MIN: CPT | Mod: ,,,

## 2025-03-19 RX ORDER — METRONIDAZOLE 500 MG/1
500 TABLET ORAL EVERY 12 HOURS
Qty: 14 TABLET | Refills: 0 | Status: SHIPPED | OUTPATIENT
Start: 2025-03-19

## 2025-03-19 RX ORDER — PANTOPRAZOLE SODIUM 40 MG/1
40 TABLET, DELAYED RELEASE ORAL DAILY
COMMUNITY
Start: 2024-09-30

## 2025-03-19 RX ORDER — CIPROFLOXACIN 500 MG/1
500 TABLET ORAL EVERY 12 HOURS
Qty: 14 TABLET | Refills: 0 | Status: SHIPPED | OUTPATIENT
Start: 2025-03-19 | End: 2025-03-19

## 2025-03-19 RX ORDER — CIPROFLOXACIN 500 MG/1
500 TABLET ORAL EVERY 12 HOURS
Qty: 14 TABLET | Refills: 0 | Status: SHIPPED | OUTPATIENT
Start: 2025-03-19

## 2025-03-19 RX ORDER — METRONIDAZOLE 500 MG/1
500 TABLET ORAL EVERY 12 HOURS
Qty: 14 TABLET | Refills: 0 | Status: SHIPPED | OUTPATIENT
Start: 2025-03-19 | End: 2025-03-19

## 2025-03-19 NOTE — PROGRESS NOTES
CLARENCE BUENO   [facility]  [address]  [place]  [address]        PATIENT NAME: Linden Chávez  : 1965  DATE: 3/19/25  MRN: [id number]        Reason for Visit / Chief Complaint: GI Problem (Patient reporting he thinks he is having flare up of diverticulitis. Symptoms include abdomen tenderness, upset stomach, and fever, diarrhea.  Similair to what he had before with diverticulitis about 3 years ago. He does take Protonix daily.  )       Update PCP  Update Chief Complaint         History of Present Illness / Problem Focused Workflow     Linden Chávez presents to the clinic with GI Problem (Patient reporting he thinks he is having flare up of diverticulitis. Symptoms include abdomen tenderness, upset stomach, and fever, diarrhea.  Similair to what he had before with diverticulitis about 3 years ago. He does take Protonix daily.  )     GI Problem  The primary symptoms include fever, abdominal pain and diarrhea. Primary symptoms do not include fatigue, nausea, vomiting, dysuria or myalgias. The illness began 3 to 5 days ago. The onset was gradual. The problem has been gradually worsening.   The fever began 2 days ago. The maximum temperature recorded prior to his arrival was 100 to 100.9 F. The temperature was taken by an oral thermometer.   The abdominal pain began 2 days ago. The abdominal pain is located in the LLQ. The abdominal pain does not radiate. The severity of the abdominal pain is 3/10. The abdominal pain is relieved by nothing.   The illness does not include chills or constipation. Significant associated medical issues include diverticulitis.       Review of Systems     Review of Systems   Constitutional:  Positive for fever. Negative for chills and fatigue.   HENT:  Negative for nasal congestion, ear discharge, ear pain, rhinorrhea, sinus pressure/congestion and sore throat.    Respiratory:  Negative for cough, chest tightness, shortness of breath, wheezing and stridor.     Cardiovascular:  Negative for palpitations and claudication.   Gastrointestinal:  Positive for abdominal pain and diarrhea. Negative for constipation, nausea, vomiting and reflux.   Genitourinary:  Negative for dysuria, flank pain, frequency, hematuria and urgency.   Musculoskeletal:  Negative for myalgias.   Neurological:  Negative for dizziness, weakness, light-headedness and headaches.   Psychiatric/Behavioral:  Negative for suicidal ideas.         Medical / Social / Family History     Past Medical History:   Diagnosis Date    Actinic keratoses     BCC (basal cell carcinoma of skin) 04/09/2019    left inferior posterior helix, Dr. Palacio    BPH (benign prostatic hyperplasia)     DDD (degenerative disc disease), thoracolumbar     Diverticulitis     GERD (gastroesophageal reflux disease)     Hyperlipidemia     Sleep apnea        Past Surgical History:   Procedure Laterality Date    BASAL CELL CARCINOMA EXCISION Left     ear    COLONOSCOPY  08/09/2017    SKIN BIOPSY  10/10/2018       Social History    reports that he has never smoked. He has never been exposed to tobacco smoke. His smokeless tobacco use includes snuff. He reports current alcohol use of about 14.0 standard drinks of alcohol per week. He reports that he does not use drugs.    Family History  's family history includes Arthritis in his brother; Cancer in his father and mother; Heart disease in his father; Hyperlipidemia in his father; Melanoma in his father.    Medications and Allergies     Medications  Outpatient Medications Marked as Taking for the 3/19/25 encounter (Office Visit) with Wilver Malik FNP   Medication Sig Dispense Refill    acetaminophen (TYLENOL) 325 MG tablet 325 mg.      methocarbamoL (ROBAXIN) 500 MG Tab Take 500 mg by mouth 4 (four) times daily as needed.      pantoprazole (PROTONIX) 40 MG tablet Take 40 mg by mouth once daily.      rosuvastatin (CRESTOR) 40 MG Tab Take 1 tablet (40 mg total) by mouth every evening. 30  "tablet 0    tadalafiL (CIALIS) 20 MG Tab Take 1 tablet (20 mg total) by mouth once daily. 30 tablet 5       Allergies  Review of patient's allergies indicates:  No Known Allergies    Physical Examination   BP (!) 94/57 (BP Location: Right arm, Patient Position: Sitting)   Pulse 83   Temp 98.2 °F (36.8 °C) (Oral)   Resp 20   Ht 5' 9" (1.753 m)   Wt 86.8 kg (191 lb 6.4 oz)   SpO2 96%   BMI 28.26 kg/m²    Physical Exam  Vitals and nursing note reviewed.   Constitutional:       General: He is awake.      Appearance: Normal appearance.   HENT:      Head: Normocephalic.      Right Ear: Tympanic membrane, ear canal and external ear normal.      Left Ear: Tympanic membrane, ear canal and external ear normal.      Nose: Nose normal.      Mouth/Throat:      Lips: Pink.      Mouth: Mucous membranes are moist.      Pharynx: Oropharynx is clear. Uvula midline.   Cardiovascular:      Rate and Rhythm: Normal rate and regular rhythm.      Heart sounds: Normal heart sounds, S1 normal and S2 normal.   Pulmonary:      Effort: Pulmonary effort is normal. No respiratory distress.      Breath sounds: Normal breath sounds. No decreased breath sounds, wheezing, rhonchi or rales.   Abdominal:      General: Bowel sounds are normal.      Palpations: Abdomen is soft.      Tenderness: There is abdominal tenderness in the left lower quadrant.   Musculoskeletal:      Cervical back: Normal range of motion.   Skin:     General: Skin is warm.      Capillary Refill: Capillary refill takes less than 2 seconds.   Neurological:      Mental Status: He is alert and oriented to person, place, and time.   Psychiatric:         Thought Content: Thought content does not include homicidal or suicidal ideation. Thought content does not include homicidal or suicidal plan.          Assessment and Plan (including Health Maintenance)      Problem List  Smart Sets  Document Outside HM   :    Plan: Start antibiotics as prescribed. RTC with worsening, new or " persistent symptoms        Health Maintenance Due   Topic Date Due    Hepatitis C Screening  Never done    Hemoglobin A1c (Diabetic Prevention Screening)  Never done    Pneumococcal Vaccines (Age 50+) (1 of 1 - PCV) Never done    COVID-19 Vaccine (3 - 2024-25 season) 09/01/2024       Problem List Items Addressed This Visit       Diverticulitis    Current Assessment & Plan   Ciprofloxacin and Flagyl RX today. Medication instructions given with understanding voiced.     Eating a low-fiber diet at first while you recover. Your health care provider may advise a liquid diet. This gives your bowel a chance to rest so that it can recover.  Eating foods, such as flake cereal, mashed potatoes, pancakes, waffles, pasta, white bread, rice, applesauce, bananas, eggs, fish, poultry, tofu, and well-cooked vegetables.  Taking your medicines as directed. Don't stop taking the medicines, even if you feel better.  Keeping track of your temperature and reporting any rise in temperature to your provider.  Taking any prescribed antibiotics exactly as directed. Don't miss any and keep taking them even if you feel better.  Drinking 6 to 8 glasses of water every day, unless told otherwise.  Using a heating pad or hot water bottle to reduce abdominal cramping or pain.         Relevant Medications    ciprofloxacin HCl (CIPRO) 500 MG tablet    metroNIDAZOLE (FLAGYL) 500 MG tablet       Health Maintenance Topics with due status: Not Due       Topic Last Completion Date    Lipid Panel 03/22/2022    TETANUS VACCINE 12/27/2022    Colorectal Cancer Screening 02/08/2024    RSV Vaccine (Age 60+ and Pregnant patients) Not Due       Future Appointments   Date Time Provider Department Center   3/31/2025  1:45 PM Gretchen Pinto MD [facility]            Signature:      CLARENCE BUENO   [address]  [place]  [address]       Date of encounter: 3/19/25

## 2025-03-19 NOTE — ASSESSMENT & PLAN NOTE
Ciprofloxacin and Flagyl RX today. Medication instructions given with understanding voiced.     Eating a low-fiber diet at first while you recover. Your health care provider may advise a liquid diet. This gives your bowel a chance to rest so that it can recover.  Eating foods, such as flake cereal, mashed potatoes, pancakes, waffles, pasta, white bread, rice, applesauce, bananas, eggs, fish, poultry, tofu, and well-cooked vegetables.  Taking your medicines as directed. Don't stop taking the medicines, even if you feel better.  Keeping track of your temperature and reporting any rise in temperature to your provider.  Taking any prescribed antibiotics exactly as directed. Don't miss any and keep taking them even if you feel better.  Drinking 6 to 8 glasses of water every day, unless told otherwise.  Using a heating pad or hot water bottle to reduce abdominal cramping or pain.

## 2025-03-19 NOTE — TELEPHONE ENCOUNTER
----- Message from Niesha sent at 3/19/2025  9:38 AM CDT -----  Regarding: Same day appt. / walk in  Who Called: Linden Chávez's wife Latanya Lynne is requesting a same day appointment. Caller declined first available appointment listed below.  When is the first available appointment?Walk in around 4:20 PM 03/19/2025Options offered (Virtual Visit, Urgent Care): Primary care Symptoms or reason for appointment: Low fever and diarrhea since yesterdayPreferred Method of Contact: Phone CallBest Call Back Number, if different: [phone] Information: Pt. Wants to be seen today

## 2025-03-27 ENCOUNTER — TELEPHONE (OUTPATIENT)
Dept: DERMATOLOGY | Facility: CLINIC | Age: 60
End: 2025-03-27
Payer: OTHER GOVERNMENT

## 2025-05-07 ENCOUNTER — HOSPITAL ENCOUNTER (OUTPATIENT)
Dept: RADIOLOGY | Facility: HOSPITAL | Age: 60
Discharge: HOME | End: 2025-05-07
Attending: NURSE PRACTITIONER
Payer: OTHER GOVERNMENT

## 2025-05-07 DIAGNOSIS — R10.9 ABDOMINAL PAIN: ICD-10-CM

## 2025-05-07 PROCEDURE — 74177 CT ABD & PELVIS W/CONTRAST: CPT | Mod: TC

## 2025-05-07 PROCEDURE — 25500020 PHARM REV CODE 255

## 2025-05-07 RX ORDER — IOPAMIDOL 755 MG/ML
100 INJECTION, SOLUTION INTRAVASCULAR
Status: COMPLETED | OUTPATIENT
Start: 2025-05-07 | End: 2025-05-07

## 2025-05-07 RX ADMIN — IOPAMIDOL 100 ML: 755 INJECTION, SOLUTION INTRAVENOUS at 10:05
